# Patient Record
Sex: MALE | Race: WHITE | NOT HISPANIC OR LATINO | URBAN - METROPOLITAN AREA
[De-identification: names, ages, dates, MRNs, and addresses within clinical notes are randomized per-mention and may not be internally consistent; named-entity substitution may affect disease eponyms.]

---

## 2024-08-15 ENCOUNTER — INPATIENT (INPATIENT)
Facility: HOSPITAL | Age: 44
LOS: 3 days | Discharge: HOME | End: 2024-08-19
Attending: STUDENT IN AN ORGANIZED HEALTH CARE EDUCATION/TRAINING PROGRAM | Admitting: INTERNAL MEDICINE
Payer: COMMERCIAL

## 2024-08-15 VITALS
RESPIRATION RATE: 16 BRPM | OXYGEN SATURATION: 98 % | TEMPERATURE: 98 F | SYSTOLIC BLOOD PRESSURE: 133 MMHG | HEART RATE: 102 BPM | DIASTOLIC BLOOD PRESSURE: 73 MMHG

## 2024-08-15 LAB
ALBUMIN SERPL ELPH-MCNC: 3.7 G/DL — SIGNIFICANT CHANGE UP (ref 3.4–5)
ALBUMIN SERPL ELPH-MCNC: 4.4 G/DL — SIGNIFICANT CHANGE UP (ref 3.3–5)
ALP SERPL-CCNC: 57 U/L — SIGNIFICANT CHANGE UP (ref 40–120)
ALP SERPL-CCNC: 63 U/L — SIGNIFICANT CHANGE UP (ref 40–120)
ALT FLD-CCNC: 35 U/L — SIGNIFICANT CHANGE UP (ref 10–45)
ALT FLD-CCNC: 46 U/L — HIGH (ref 12–42)
AMPHET UR-MCNC: NEGATIVE — SIGNIFICANT CHANGE UP
ANION GAP SERPL CALC-SCNC: 11 MMOL/L — SIGNIFICANT CHANGE UP (ref 5–17)
ANION GAP SERPL CALC-SCNC: 14 MMOL/L — SIGNIFICANT CHANGE UP (ref 5–17)
ANION GAP SERPL CALC-SCNC: 16 MMOL/L — SIGNIFICANT CHANGE UP (ref 9–16)
APPEARANCE UR: CLEAR — SIGNIFICANT CHANGE UP
APTT BLD: 28.1 SEC — SIGNIFICANT CHANGE UP (ref 24.5–35.6)
AST SERPL-CCNC: 48 U/L — HIGH (ref 15–37)
AST SERPL-CCNC: 88 U/L — HIGH (ref 10–40)
BACTERIA # UR AUTO: NEGATIVE /HPF — SIGNIFICANT CHANGE UP
BARBITURATES UR SCN-MCNC: NEGATIVE — SIGNIFICANT CHANGE UP
BASOPHILS # BLD AUTO: 0.02 K/UL — SIGNIFICANT CHANGE UP (ref 0–0.2)
BASOPHILS # BLD AUTO: 0.05 K/UL — SIGNIFICANT CHANGE UP (ref 0–0.2)
BASOPHILS NFR BLD AUTO: 0.1 % — SIGNIFICANT CHANGE UP (ref 0–2)
BASOPHILS NFR BLD AUTO: 0.3 % — SIGNIFICANT CHANGE UP (ref 0–2)
BENZODIAZ UR-MCNC: NEGATIVE — SIGNIFICANT CHANGE UP
BILIRUB SERPL-MCNC: 1.8 MG/DL — HIGH (ref 0.2–1.2)
BILIRUB SERPL-MCNC: 1.9 MG/DL — HIGH (ref 0.2–1.2)
BILIRUB UR-MCNC: NEGATIVE — SIGNIFICANT CHANGE UP
BLD GP AB SCN SERPL QL: NEGATIVE — SIGNIFICANT CHANGE UP
BUN SERPL-MCNC: 11 MG/DL — SIGNIFICANT CHANGE UP (ref 7–23)
BUN SERPL-MCNC: 6 MG/DL — LOW (ref 7–23)
BUN SERPL-MCNC: 7 MG/DL — SIGNIFICANT CHANGE UP (ref 7–23)
CALCIUM SERPL-MCNC: 7.6 MG/DL — LOW (ref 8.5–10.5)
CALCIUM SERPL-MCNC: 7.8 MG/DL — LOW (ref 8.4–10.5)
CALCIUM SERPL-MCNC: 7.9 MG/DL — LOW (ref 8.4–10.5)
CHLORIDE SERPL-SCNC: 83 MMOL/L — LOW (ref 96–108)
CHLORIDE SERPL-SCNC: 89 MMOL/L — LOW (ref 96–108)
CHLORIDE SERPL-SCNC: 90 MMOL/L — LOW (ref 96–108)
CO2 SERPL-SCNC: 17 MMOL/L — LOW (ref 22–31)
CO2 SERPL-SCNC: 20 MMOL/L — LOW (ref 22–31)
CO2 SERPL-SCNC: 23 MMOL/L — SIGNIFICANT CHANGE UP (ref 22–31)
COCAINE METAB.OTHER UR-MCNC: NEGATIVE — SIGNIFICANT CHANGE UP
COLOR SPEC: YELLOW — SIGNIFICANT CHANGE UP
CREAT ?TM UR-MCNC: 5 MG/DL — SIGNIFICANT CHANGE UP
CREAT SERPL-MCNC: 0.67 MG/DL — SIGNIFICANT CHANGE UP (ref 0.5–1.3)
CREAT SERPL-MCNC: 0.75 MG/DL — SIGNIFICANT CHANGE UP (ref 0.5–1.3)
CREAT SERPL-MCNC: 1.06 MG/DL — SIGNIFICANT CHANGE UP (ref 0.5–1.3)
DIFF PNL FLD: ABNORMAL
EGFR: 113 ML/MIN/1.73M2 — SIGNIFICANT CHANGE UP
EGFR: 117 ML/MIN/1.73M2 — SIGNIFICANT CHANGE UP
EGFR: 88 ML/MIN/1.73M2 — SIGNIFICANT CHANGE UP
EOSINOPHIL # BLD AUTO: 0.01 K/UL — SIGNIFICANT CHANGE UP (ref 0–0.5)
EOSINOPHIL # BLD AUTO: 0.01 K/UL — SIGNIFICANT CHANGE UP (ref 0–0.5)
EOSINOPHIL NFR BLD AUTO: 0.1 % — SIGNIFICANT CHANGE UP (ref 0–6)
EOSINOPHIL NFR BLD AUTO: 0.1 % — SIGNIFICANT CHANGE UP (ref 0–6)
EPI CELLS # UR: PRESENT
ETHANOL SERPL-MCNC: <3 MG/DL — SIGNIFICANT CHANGE UP
FENTANYL UR QL SCN: NEGATIVE — SIGNIFICANT CHANGE UP
GLUCOSE SERPL-MCNC: 131 MG/DL — HIGH (ref 70–99)
GLUCOSE SERPL-MCNC: 141 MG/DL — HIGH (ref 70–99)
GLUCOSE SERPL-MCNC: 168 MG/DL — HIGH (ref 70–99)
GLUCOSE UR QL: NEGATIVE MG/DL — SIGNIFICANT CHANGE UP
HCT VFR BLD CALC: 32.6 % — LOW (ref 39–50)
HCT VFR BLD CALC: 36.2 % — LOW (ref 39–50)
HCT VFR BLD CALC: SIGNIFICANT CHANGE UP (ref 39–50)
HGB BLD-MCNC: 12.1 G/DL — LOW (ref 13–17)
HGB BLD-MCNC: 13.1 G/DL — SIGNIFICANT CHANGE UP (ref 13–17)
HGB BLD-MCNC: 13.8 G/DL — SIGNIFICANT CHANGE UP (ref 13–17)
IMM GRANULOCYTES NFR BLD AUTO: 0.4 % — SIGNIFICANT CHANGE UP (ref 0–0.9)
IMM GRANULOCYTES NFR BLD AUTO: 1.4 % — HIGH (ref 0–0.9)
INR BLD: 0.98 — SIGNIFICANT CHANGE UP (ref 0.85–1.18)
KETONES UR-MCNC: ABNORMAL MG/DL
LACTATE BLDV-MCNC: 10 MMOL/L — CRITICAL HIGH (ref 0.5–2)
LACTATE BLDV-MCNC: 5.1 MMOL/L — CRITICAL HIGH (ref 0.5–2)
LACTATE SERPL-SCNC: 2.9 MMOL/L — HIGH (ref 0.5–2)
LACTATE SERPL-SCNC: 3.6 MMOL/L — HIGH (ref 0.5–2)
LEUKOCYTE ESTERASE UR-ACNC: NEGATIVE — SIGNIFICANT CHANGE UP
LYMPHOCYTES # BLD AUTO: 0.9 K/UL — LOW (ref 1–3.3)
LYMPHOCYTES # BLD AUTO: 1.03 K/UL — SIGNIFICANT CHANGE UP (ref 1–3.3)
LYMPHOCYTES # BLD AUTO: 6.2 % — LOW (ref 13–44)
LYMPHOCYTES # BLD AUTO: 6.6 % — LOW (ref 13–44)
LYMPHOCYTES # BLD AUTO: SIGNIFICANT CHANGE UP (ref 13–44)
MAGNESIUM SERPL-MCNC: 1.1 MG/DL — LOW (ref 1.6–2.6)
MAGNESIUM SERPL-MCNC: 1.8 MG/DL — SIGNIFICANT CHANGE UP (ref 1.6–2.6)
MAGNESIUM SERPL-MCNC: 2 MG/DL — SIGNIFICANT CHANGE UP (ref 1.6–2.6)
MCHC RBC-ENTMCNC: 32.8 PG — SIGNIFICANT CHANGE UP (ref 27–34)
MCHC RBC-ENTMCNC: 33.9 PG — SIGNIFICANT CHANGE UP (ref 27–34)
MCHC RBC-ENTMCNC: 36.2 GM/DL — HIGH (ref 32–36)
MCHC RBC-ENTMCNC: 37.1 GM/DL — HIGH (ref 32–36)
MCHC RBC-ENTMCNC: SIGNIFICANT CHANGE UP (ref 27–34)
MCHC RBC-ENTMCNC: SIGNIFICANT CHANGE UP (ref 32–36)
MCV RBC AUTO: 88.3 FL — SIGNIFICANT CHANGE UP (ref 80–100)
MCV RBC AUTO: 93.5 FL — SIGNIFICANT CHANGE UP (ref 80–100)
MCV RBC AUTO: SIGNIFICANT CHANGE UP (ref 80–100)
METHADONE UR-MCNC: NEGATIVE — SIGNIFICANT CHANGE UP
MONOCYTES # BLD AUTO: 0.66 K/UL — SIGNIFICANT CHANGE UP (ref 0–0.9)
MONOCYTES # BLD AUTO: 1.27 K/UL — HIGH (ref 0–0.9)
MONOCYTES NFR BLD AUTO: 4 % — SIGNIFICANT CHANGE UP (ref 2–14)
MONOCYTES NFR BLD AUTO: 9.3 % — SIGNIFICANT CHANGE UP (ref 2–14)
MONOCYTES NFR BLD AUTO: SIGNIFICANT CHANGE UP (ref 2–14)
NEUTROPHILS # BLD AUTO: 11.38 K/UL — HIGH (ref 1.8–7.4)
NEUTROPHILS # BLD AUTO: 14.71 K/UL — HIGH (ref 1.8–7.4)
NEUTROPHILS NFR BLD AUTO: 83.5 % — HIGH (ref 43–77)
NEUTROPHILS NFR BLD AUTO: 88 % — HIGH (ref 43–77)
NEUTROPHILS NFR BLD AUTO: SIGNIFICANT CHANGE UP (ref 43–77)
NITRITE UR-MCNC: NEGATIVE — SIGNIFICANT CHANGE UP
NRBC # BLD: 0 /100 WBCS — SIGNIFICANT CHANGE UP (ref 0–0)
NRBC # BLD: 0 /100 WBCS — SIGNIFICANT CHANGE UP (ref 0–0)
OPIATES UR-MCNC: NEGATIVE — SIGNIFICANT CHANGE UP
OSMOLALITY SERPL: 261 MOSM/KG — LOW (ref 275–300)
OSMOLALITY UR: 60 MOSM/KG — LOW (ref 300–900)
PCP SPEC-MCNC: SIGNIFICANT CHANGE UP
PCP UR-MCNC: NEGATIVE — SIGNIFICANT CHANGE UP
PH UR: 5.5 — SIGNIFICANT CHANGE UP (ref 5–8)
PHOSPHATE SERPL-MCNC: 1.5 MG/DL — LOW (ref 2.5–4.5)
PHOSPHATE SERPL-MCNC: 2.1 MG/DL — LOW (ref 2.5–4.5)
PLATELET # BLD AUTO: 223 K/UL — SIGNIFICANT CHANGE UP (ref 150–400)
PLATELET # BLD AUTO: 241 K/UL — SIGNIFICANT CHANGE UP (ref 150–400)
PLATELET # BLD AUTO: 296 K/UL — SIGNIFICANT CHANGE UP (ref 150–400)
POTASSIUM SERPL-MCNC: 4 MMOL/L — SIGNIFICANT CHANGE UP (ref 3.5–5.3)
POTASSIUM SERPL-MCNC: 4.2 MMOL/L — SIGNIFICANT CHANGE UP (ref 3.5–5.3)
POTASSIUM SERPL-MCNC: 5.2 MMOL/L — SIGNIFICANT CHANGE UP (ref 3.5–5.3)
POTASSIUM SERPL-SCNC: 4 MMOL/L — SIGNIFICANT CHANGE UP (ref 3.5–5.3)
POTASSIUM SERPL-SCNC: 4.2 MMOL/L — SIGNIFICANT CHANGE UP (ref 3.5–5.3)
POTASSIUM SERPL-SCNC: 5.2 MMOL/L — SIGNIFICANT CHANGE UP (ref 3.5–5.3)
POTASSIUM UR-SCNC: 4 MMOL/L — SIGNIFICANT CHANGE UP
PROLACTIN SERPL-MCNC: 5.05 NG/ML — SIGNIFICANT CHANGE UP (ref 3.4–24.1)
PROT ?TM UR-MCNC: <4 MG/DL — SIGNIFICANT CHANGE UP (ref 0–12)
PROT SERPL-MCNC: 6.8 G/DL — SIGNIFICANT CHANGE UP (ref 6–8.3)
PROT SERPL-MCNC: 7 G/DL — SIGNIFICANT CHANGE UP (ref 6.4–8.2)
PROT UR-MCNC: ABNORMAL MG/DL
PROT/CREAT UR-RTO: <0.8 RATIO — HIGH (ref 0–0.2)
PROTHROM AB SERPL-ACNC: 11.2 SEC — SIGNIFICANT CHANGE UP (ref 9.5–13)
RBC # BLD: 3.69 M/UL — LOW (ref 4.2–5.8)
RBC # BLD: 3.87 M/UL — LOW (ref 4.2–5.8)
RBC # BLD: SIGNIFICANT CHANGE UP (ref 4.2–5.8)
RBC # FLD: 12.7 % — SIGNIFICANT CHANGE UP (ref 10.3–14.5)
RBC # FLD: 12.7 % — SIGNIFICANT CHANGE UP (ref 10.3–14.5)
RBC CASTS # UR COMP ASSIST: 2 /HPF — SIGNIFICANT CHANGE UP (ref 0–4)
RH IG SCN BLD-IMP: POSITIVE — SIGNIFICANT CHANGE UP
SODIUM SERPL-SCNC: 116 MMOL/L — CRITICAL LOW (ref 132–145)
SODIUM SERPL-SCNC: 120 MMOL/L — CRITICAL LOW (ref 132–145)
SODIUM SERPL-SCNC: 123 MMOL/L — LOW (ref 135–145)
SODIUM SERPL-SCNC: 124 MMOL/L — LOW (ref 135–145)
SODIUM UR-SCNC: <20 MMOL/L — SIGNIFICANT CHANGE UP
SP GR SPEC: 1.01 — SIGNIFICANT CHANGE UP (ref 1–1.03)
THC UR QL: POSITIVE
TROPONIN I, HIGH SENSITIVITY RESULT: 18.4 NG/L — SIGNIFICANT CHANGE UP
TSH SERPL-MCNC: 0.55 UIU/ML — SIGNIFICANT CHANGE UP (ref 0.27–4.2)
UROBILINOGEN FLD QL: 0.2 MG/DL — SIGNIFICANT CHANGE UP (ref 0.2–1)
UUN UR-MCNC: 41 MG/DL — SIGNIFICANT CHANGE UP
WBC # BLD: 13.64 K/UL — HIGH (ref 3.8–10.5)
WBC # BLD: 15.56 K/UL — HIGH (ref 3.8–10.5)
WBC # BLD: 16.69 K/UL — HIGH (ref 3.8–10.5)
WBC # FLD AUTO: 13.64 K/UL — HIGH (ref 3.8–10.5)
WBC # FLD AUTO: 15.56 K/UL — HIGH (ref 3.8–10.5)
WBC # FLD AUTO: 16.69 K/UL — HIGH (ref 3.8–10.5)
WBC UR QL: 2 /HPF — SIGNIFICANT CHANGE UP (ref 0–5)

## 2024-08-15 PROCEDURE — 99291 CRITICAL CARE FIRST HOUR: CPT

## 2024-08-15 PROCEDURE — 71045 X-RAY EXAM CHEST 1 VIEW: CPT | Mod: 26

## 2024-08-15 PROCEDURE — 70450 CT HEAD/BRAIN W/O DYE: CPT | Mod: 26,MC

## 2024-08-15 PROCEDURE — 99285 EMERGENCY DEPT VISIT HI MDM: CPT

## 2024-08-15 PROCEDURE — 74177 CT ABD & PELVIS W/CONTRAST: CPT | Mod: 26,MC

## 2024-08-15 RX ORDER — LORAZEPAM 4 MG/ML
2 INJECTION INTRAMUSCULAR; INTRAVENOUS ONCE
Refills: 0 | Status: DISCONTINUED | OUTPATIENT
Start: 2024-08-15 | End: 2024-08-15

## 2024-08-15 RX ORDER — SODIUM CHLORIDE 3 G/100ML
100 INJECTION, SOLUTION INTRAVENOUS ONCE
Refills: 0 | Status: DISCONTINUED | OUTPATIENT
Start: 2024-08-15 | End: 2024-08-15

## 2024-08-15 RX ORDER — ENOXAPARIN SODIUM 100 MG/ML
40 INJECTION SUBCUTANEOUS EVERY 24 HOURS
Refills: 0 | Status: DISCONTINUED | OUTPATIENT
Start: 2024-08-15 | End: 2024-08-19

## 2024-08-15 RX ORDER — VANCOMYCIN/0.9 % SOD CHLORIDE 1.75G/25
1500 PLASTIC BAG, INJECTION (ML) INTRAVENOUS ONCE
Refills: 0 | Status: DISCONTINUED | OUTPATIENT
Start: 2024-08-15 | End: 2024-08-15

## 2024-08-15 RX ORDER — VANCOMYCIN/0.9 % SOD CHLORIDE 1.75G/25
1000 PLASTIC BAG, INJECTION (ML) INTRAVENOUS ONCE
Refills: 0 | Status: COMPLETED | OUTPATIENT
Start: 2024-08-15 | End: 2024-08-15

## 2024-08-15 RX ORDER — SODIUM CHLORIDE 3 G/100ML
100 INJECTION, SOLUTION INTRAVENOUS ONCE
Refills: 0 | Status: COMPLETED | OUTPATIENT
Start: 2024-08-15 | End: 2024-08-15

## 2024-08-15 RX ORDER — DESMOPRESSIN ACETATE 4 UG/ML
2 INJECTION, SOLUTION INTRAVENOUS; SUBCUTANEOUS ONCE
Refills: 0 | Status: COMPLETED | OUTPATIENT
Start: 2024-08-15 | End: 2024-08-15

## 2024-08-15 RX ORDER — SODIUM CHLORIDE 9 MG/ML
1000 INJECTION INTRAMUSCULAR; INTRAVENOUS; SUBCUTANEOUS ONCE
Refills: 0 | Status: COMPLETED | OUTPATIENT
Start: 2024-08-15 | End: 2024-08-15

## 2024-08-15 RX ORDER — CHLORHEXIDINE GLUCONATE 40 MG/ML
1 SOLUTION TOPICAL
Refills: 0 | Status: DISCONTINUED | OUTPATIENT
Start: 2024-08-15 | End: 2024-08-19

## 2024-08-15 RX ORDER — DESMOPRESSIN ACETATE 4 UG/ML
2 INJECTION, SOLUTION INTRAVENOUS; SUBCUTANEOUS ONCE
Refills: 0 | Status: DISCONTINUED | OUTPATIENT
Start: 2024-08-15 | End: 2024-08-15

## 2024-08-15 RX ORDER — DEXMEDETOMIDINE HYDROCHLORIDE IN 0.9% SODIUM CHLORIDE 4 UG/ML
0.2 INJECTION INTRAVENOUS
Qty: 400 | Refills: 0 | Status: DISCONTINUED | OUTPATIENT
Start: 2024-08-15 | End: 2024-08-17

## 2024-08-15 RX ORDER — DESMOPRESSIN ACETATE 4 UG/ML
2 INJECTION, SOLUTION INTRAVENOUS; SUBCUTANEOUS
Refills: 0 | Status: DISCONTINUED | OUTPATIENT
Start: 2024-08-15 | End: 2024-08-16

## 2024-08-15 RX ADMIN — Medication 100 MILLIGRAM(S): at 13:19

## 2024-08-15 RX ADMIN — DEXMEDETOMIDINE HYDROCHLORIDE IN 0.9% SODIUM CHLORIDE 3.4 MICROGRAM(S)/KG/HR: 4 INJECTION INTRAVENOUS at 17:49

## 2024-08-15 RX ADMIN — Medication 25 GRAM(S): at 12:34

## 2024-08-15 RX ADMIN — DESMOPRESSIN ACETATE 2 MICROGRAM(S): 4 INJECTION, SOLUTION INTRAVENOUS; SUBCUTANEOUS at 16:29

## 2024-08-15 RX ADMIN — LORAZEPAM 2 MILLIGRAM(S): 4 INJECTION INTRAMUSCULAR; INTRAVENOUS at 16:29

## 2024-08-15 RX ADMIN — Medication 250 MILLILITER(S): at 16:54

## 2024-08-15 RX ADMIN — SODIUM CHLORIDE 600 MILLILITER(S): 3 INJECTION, SOLUTION INTRAVENOUS at 12:35

## 2024-08-15 RX ADMIN — LORAZEPAM 2 MILLIGRAM(S): 4 INJECTION INTRAMUSCULAR; INTRAVENOUS at 17:49

## 2024-08-15 RX ADMIN — Medication 250 MILLIGRAM(S): at 14:08

## 2024-08-15 RX ADMIN — LORAZEPAM 2 MILLIGRAM(S): 4 INJECTION INTRAMUSCULAR; INTRAVENOUS at 14:06

## 2024-08-15 RX ADMIN — LORAZEPAM 2 MILLIGRAM(S): 4 INJECTION INTRAMUSCULAR; INTRAVENOUS at 11:46

## 2024-08-15 RX ADMIN — SODIUM CHLORIDE 100 MILLILITER(S): 9 INJECTION INTRAMUSCULAR; INTRAVENOUS; SUBCUTANEOUS at 14:07

## 2024-08-15 RX ADMIN — SODIUM CHLORIDE 1000 MILLILITER(S): 9 INJECTION INTRAMUSCULAR; INTRAVENOUS; SUBCUTANEOUS at 11:46

## 2024-08-15 NOTE — ED ADULT NURSE NOTE - CAS EDN DISCHARGE INTERVENTIONS
English
IV intact
Mercedes Flap Text: The defect edges were debeveled with a #15 scalpel blade.  Given the location of the defect, shape of the defect and the proximity to free margins a Mercedes flap was deemed most appropriate.  Using a sterile surgical marker, an appropriate advancement flap was drawn incorporating the defect and placing the expected incisions within the relaxed skin tension lines where possible. The area thus outlined was incised deep to adipose tissue with a #15 scalpel blade.  The skin margins were undermined to an appropriate distance in all directions utilizing iris scissors.

## 2024-08-15 NOTE — ED ADULT NURSE REASSESSMENT NOTE - NS ED NURSE REASSESS COMMENT FT1
EICU consultation completed with EMS at bedside. As per Dr. Oliva, pt is okay for transport to [facility] ICU. All medications and dosages confirmed with EMS. VSS

## 2024-08-15 NOTE — H&P ADULT - ASSESSMENT
Patient is 44yMale with no significant PMH who presents from [facility] w seizures likely 2/2 symptomatic hyponatremia.     NEURO  #Encephalopathic      CV  #      PULM  #      RENAL  #Hyponatremia  #I&O      GI  #      ENDO  #      ID  #      HEME/ONC  #      ID:  #      SKIN:  #Lines:      PREVENTIVE   F:  E:  N:  GI:  DVT:  DISPO:       Patient is 44 YOM with no significant PMH who presents from [facility] w seizures likely 2/2 symptomatic hyponatremia. Patient remains altered, monitoring closely.    NEURO  #Encephalopathic      CV  ERICK      PULM  ERICK      RENAL  #Hyponatremia  Patient     #I&O      GI  #      ENDO  #      ID:  #Leukocytosis  Noted to have leukocytosis in ED, no fevers  Likely reactive iso seizures    - monitor fever curve and WBC      HEME/ONC  ERICK        SKIN:  #Lines: PIV      PREVENTIVE   F: D5W  E:   N: NPO  GI:  DVT:  DISPO: MICU       Patient is 44 YOM with no significant PMH who presents from [facility] w seizures likely 2/2 symptomatic hyponatremia. Patient remains altered, monitoring closely with regular CMP given potential rapid overcorrection.    NEURO  #Encephalopathic  AMS 2/2 symptomatic hyponatremia  Now s/p 8mg Ativan given agitation    - c/w Precedex drip for now given agitation  - correcting Na as below    CV  ERICK      PULM  ERICK      RENAL  #Hyponatremia  Patient with severe hypoNa to 116 after drinking a large amount of tap water d/t marijuana intoxication  Serum osmolarity and urine studies indicated of primary polydipsia  Patients can rapidly overcorrect, monitor closely  s/p 100cc hypertonic saline in [facility] ED    - Q2H CMP  - started 2mcg DDAVP Q6H  - goal is 124 by 11AM tomorrow morning  - D5W boluses as needed to maintain goal    #I&O  Castillo in place  - monitor closely     GI  ERICK      ENDO  ERICK      ID:  #Leukocytosis  Noted to have leukocytosis in ED, no fevers  Likely reactive iso seizures    - monitor fever curve and WBC      HEME/ONC  ERICK        SKIN:  #Lines: PIV      PREVENTIVE   F: D5W as needed  E: K>4, Mg>2  N: NPO  GI: none  DVT: lovenox 40mg  DISPO: MICU

## 2024-08-15 NOTE — H&P ADULT - CRITICAL CARE ATTENDING COMMENT
symptomatic hyponatremia, s/p hypertonic bolus and ddavp  de la vega in place, q4h BMP and urine osms and Na  renal following

## 2024-08-15 NOTE — PROVIDER CONTACT NOTE (EICU) - BACKGROUND
43 yo M with no PMH a/w AMS and s/p seizure like activity.  Patient noted to have taken 40 mg of THC and 1000 mg of CBD in an attempt to sleep last night.  Noted to have associated nausea and vomiting.      Patient given 1L bolus of NS in ED, and 100cc NaCl 3% with improvement of Na from 116 to 120.  Pt also received Ativan 2mg x 1 and Magnesium Sulfate 2 grams x 1.  Lactate improved from 10 to 5.1 with IVF.    CTH with no acute abnormalities.  CT A/P notable for " No bowel obstruction. Mild periportal edema, which can be seen in setting of aggressive IV hydration. Other causes of hepatic edema include acute hepatitis or hypoalbuminemia may also be considered."

## 2024-08-15 NOTE — H&P ADULT - NSHPPHYSICALEXAM_GEN_ALL_CORE
VITAL SIGNS:  Vital Signs Last 24 Hrs  T(C): 37.4 (15 Aug 2024 16:30), Max: 37.4 (15 Aug 2024 16:30)  T(F): 99.3 (15 Aug 2024 16:30), Max: 99.3 (15 Aug 2024 16:30)  HR: 98 (15 Aug 2024 16:30) (75 - 102)  BP: 164/80 (15 Aug 2024 16:30) (121/65 - 169/73)  BP(mean): 114 (15 Aug 2024 16:30) (87 - 114)  RR: 22 (15 Aug 2024 16:30) (16 - 22)  SpO2: 88% (15 Aug 2024 16:30) (88% - 99%)    Parameters below as of 15 Aug 2024 16:30  Patient On (Oxygen Delivery Method): room air        GEN: Resting comfortably in NAD  ENMT: Moist oral mucosa; no conjunctival injection  RESP: No respiratory distress, no use of accessory muscles; CTA b/l, no WRR  CV: RRR, +S1S2, no MRG; no JVD; no peripheral edema  2+ radial, DP, PT   GI: Soft, NT, ND, no rebound, no guarding  : Castillo in place draining entirely clear urine  MSK: No digital clubbing or cyanosis  SKIN: No rashes or ulcers noted  NEURO: Answers questions appropriately, moving all extremities spontaneously VITAL SIGNS:  Vital Signs Last 24 Hrs  T(C): 37.4 (15 Aug 2024 16:30), Max: 37.4 (15 Aug 2024 16:30)  T(F): 99.3 (15 Aug 2024 16:30), Max: 99.3 (15 Aug 2024 16:30)  HR: 98 (15 Aug 2024 16:30) (75 - 102)  BP: 164/80 (15 Aug 2024 16:30) (121/65 - 169/73)  BP(mean): 114 (15 Aug 2024 16:30) (87 - 114)  RR: 22 (15 Aug 2024 16:30) (16 - 22)  SpO2: 88% (15 Aug 2024 16:30) (88% - 99%)    Parameters below as of 15 Aug 2024 16:30  Patient On (Oxygen Delivery Method): room air      GEN: Resting comfortably in NAD  ENMT: Moist oral mucosa; no conjunctival injection  RESP: No respiratory distress, no use of accessory muscles; CTA b/l, no WRR  CV: RRR, +S1S2, no MRG; no JVD; no peripheral edema  2+ radial, DP, PT   GI: Soft, NT, ND, no rebound, no guarding  : Castillo in place draining entirely clear urine  MSK: No digital clubbing or cyanosis  SKIN: No rashes or ulcers noted  NEURO: Answers questions appropriately, moving all extremities spontaneously VITAL SIGNS:  Vital Signs Last 24 Hrs  T(C): 37.4 (15 Aug 2024 16:30), Max: 37.4 (15 Aug 2024 16:30)  T(F): 99.3 (15 Aug 2024 16:30), Max: 99.3 (15 Aug 2024 16:30)  HR: 98 (15 Aug 2024 16:30) (75 - 102)  BP: 164/80 (15 Aug 2024 16:30) (121/65 - 169/73)  BP(mean): 114 (15 Aug 2024 16:30) (87 - 114)  RR: 22 (15 Aug 2024 16:30) (16 - 22)  SpO2: 88% (15 Aug 2024 16:30) (88% - 99%)    Parameters below as of 15 Aug 2024 16:30  Patient On (Oxygen Delivery Method): room air      GEN: Obtunded  ENMT: Moist oral mucosa; no conjunctival injection, pupils large b/l but reactive  RESP: No respiratory distress, no use of accessory muscles; CTA b/l, no WRR  CV: RRR, +S1S2, no MRG; no JVD; no peripheral edema  2+ radial, DP, PT   GI: Soft, NT, ND, no rebound, no guarding  : Castillo in place draining entirely clear urine  MSK: No digital clubbing or cyanosis  SKIN: No rashes or ulcers noted  NEURO: Altered, currently non-responsive to questions, moving all extremities spontaneously

## 2024-08-15 NOTE — CHART NOTE - NSCHARTNOTEFT_GEN_A_CORE
Chart reviewed, f/u corrected Na ~124meq @ 9:23pm, stable, with UOP decreasing to ~33ml/hr during last 3hr, indicative of DDAVP action.   Continue current management with standing DDAVP, will f/u next Na level in am labs and if Na>124meq admin 500ml bolus of D5W and recheck bmp 1h after DW5 completed.

## 2024-08-15 NOTE — PROVIDER CONTACT NOTE (EICU) - ASSESSMENT
Case discussed with bedside nurse and EMS at bedside.  Case reviewed with EMS.  - Continue NS at 75cc/hr for transport  - Vancomycin 1g IVPB to be given.  - EMS concern for mental status - Noted to have recent seizure and hyponatremia as likely causes.  CTH with no ICH.  Would not correct Na faster given unknown chronicity.  Will aim for 6-8mEq in 24 hours, and Na noted to improvement to 120 (delta of +4) at this time.

## 2024-08-15 NOTE — ED PROVIDER NOTE - PROGRESS NOTE DETAILS
Patient is sitting comfortably room, no acute distress  Labs and imaging reviewed  Labs are significant for a lactate of 10, sodium of 116, a white count of 16,000, and a magnesium of 1.1.  Patient also has positive for THC as expected  Will give patient a dose of hypertonic saline.  Patient has received ceftriaxone and vancomycin as he met sepsis criteria.  CT head is unremarkable  Spoke with Dr. Gant, intensivist, who accepts patient to his service.  Is requesting to get an abdominal CT and repeat sodium after dose of hypertonic saline.  Patient accepted to medical ICU.

## 2024-08-15 NOTE — ED PROVIDER NOTE - PHYSICAL EXAMINATION
General: well developed, well nourished, no distress  Eyes: no scleral injection, pupils = PERRLA  Neck: non-tender, full range of motion, supple.   Respiratory: unlabored breathing, CTAB  Cardiovascular: no central cyanosis, RRR  Neurologic: responsive to verbal stimuli  Skin: normal color.  Negative For: rash

## 2024-08-15 NOTE — CONSULT NOTE ADULT - SUBJECTIVE AND OBJECTIVE BOX
NEPHROLOGY CONSULTATION NOTE    43 y/o M w no PMH transferred from [facility] with AMS and seizure like activity. Took 40mg of THC and 1000mg CBD last night plus a glass of wine and wake up this morning feeling dizzy, then started drinking water, as per pt's wife: "he drank water for 2 and a half hours straight and started vomiting and having abnormal behaviour, he was slightly out of it, and unsteady on his feet". Wife states that he does not have a seizure hx, and this has never happened before. When EMS got to the scene pt was combative. Corrected Na upon arrival this am was 117meq, now pte in ICU with Na 124, corrected 7 meq in 5h period, Nephrology consulted.     PAST MEDICAL & SURGICAL HISTORY:  No pertinent past medical history    Allergies:  No Known Allergies    Home Medications Reviewed  Hospital Medications:   MEDICATIONS  (STANDING):  chlorhexidine 2% Cloths 1 Application(s) Topical <User Schedule>  desmopressin Injectable 2 MICROGram(s) IV Push <User Schedule>  dexMEDEtomidine Infusion 0.2 MICROgram(s)/kG/Hr (3.4 mL/Hr) IV Continuous <Continuous>  dextrose 5%. 500 milliLiter(s) (250 mL/Hr) IV Continuous <Continuous>  dextrose 5%. 1000 milliLiter(s) (250 mL/Hr) IV Continuous <Continuous>  dextrose 5%. 500 milliLiter(s) (250 mL/Hr) IV Continuous <Continuous>  enoxaparin Injectable 40 milliGRAM(s) SubCutaneous every 24 hours    SOCIAL HISTORY:  Denies ETOH,Smoking,   FAMILY HISTORY:    REVIEW OF SYSTEMS:  All other review of systems is negative unless indicated above.    VITALS:  Vital Signs Last 24 Hrs  T(C): 37.4 (15 Aug 2024 16:30), Max: 37.4 (15 Aug 2024 16:30)  T(F): 99.3 (15 Aug 2024 16:30), Max: 99.3 (15 Aug 2024 16:30)  HR: 98 (15 Aug 2024 16:30) (75 - 102)  BP: 164/80 (15 Aug 2024 16:30) (121/65 - 169/73)  BP(mean): 114 (15 Aug 2024 16:30) (87 - 114)  RR: 22 (15 Aug 2024 16:30) (16 - 22)  SpO2: 88% (15 Aug 2024 16:30) (88% - 99%)    Parameters below as of 15 Aug 2024 16:30  Patient On (Oxygen Delivery Method): room air        08-15 @ 07:01  -  08-15 @ 18:57  --------------------------------------------------------  IN: 0 mL / OUT: 2800 mL / NET: -2800 mL        Weight (kg): 68 (08-15 @ 12:52)    PHYSICAL EXAM:  Constitutional: NAD  Neck: No JVD  Respiratory: CTAB, no wheezes, rales or rhonchi  Cardiovascular: S1, S2, RRR  Gastrointestinal: ND/NT, +BS  Extremities:  No peripheral edema in LEs.   Neurological: Alert, following commands intermittently, no focal motor deficits.   : + de la vega.         LABS:  08-15    124<L>  |  90<L>  |  7   ----------------------------<  131<H>  4.2   |  20<L>  |  0.67    Ca    7.9<L>      15 Aug 2024 16:28  Phos  2.1     08-15  Mg     2.0     08-15    TPro  6.8  /  Alb  4.4  /  TBili  1.9<H>  /  DBili      /  AST  88<H>  /  ALT  35  /  AlkPhos  57  08-15    Creatinine Trend: 0.67 <--, 1.06 <--                        13.8   15.56 )-----------( 241      ( 15 Aug 2024 16:28 )             See note    Urine Studies:  Urinalysis Basic - ( 15 Aug 2024 16:28 )    Color:  / Appearance:  / SG:  / pH:   Gluc: 131 mg/dL / Ketone:   / Bili:  / Urobili:    Blood:  / Protein:  / Nitrite:    Leuk Esterase:  / RBC:  / WBC    Sq Epi:  / Non Sq Epi:  / Bacteria:       Sodium, Random Urine: <20 mmol/L (08-15 @ 16:45)  Creatinine, Random Urine: 5 mg/dL (08-15 @ 16:45)  Protein/Creatinine Ratio Calculation: <0.8 Ratio (08-15 @ 16:45)  Osmolality, Random Urine: 60 mosm/kg (08-15 @ 16:45)  Potassium, Random Urine: 4 mmol/L (08-15 @ 16:45)            RADIOLOGY & ADDITIONAL STUDIES: Reviewed.

## 2024-08-15 NOTE — ED PROVIDER NOTE - OBJECTIVE STATEMENT
43 yo M, no pmh, presents to this ED for AMS. Pt presents today with wife and daughter. Pt is altered and responsive to verbal stimuli, is agitated and cannot provide a hx.  EMS states that they responded to the 911 rehana from his wife. Wife states that last night he ate 40 mg of THC and 1000 mg of CBD in an attempt to sleep. They are visiting from [place]. Wife states that this morning he felt dizzy, so she instructed him to drink water. STates that "he drank water for 2 and a half hours straight'. Vomited once, and then had seizure like activity. Wife states that he does not have a seizure hx, and this has never happened before. Also states that he has a couple glasses of wine last night with dinner. When EMS got to the scene pt was combative, however they were able to get him on the stretcher and transport him here.

## 2024-08-15 NOTE — ED PROVIDER NOTE - CLINICAL SUMMARY MEDICAL DECISION MAKING FREE TEXT BOX
43 yo M presents to this ED for AMS, seizure like activity  On arrival VSS  Physical exam is significant for AMS, responsive to verbal stimuli, however is not speaking  Will obtain labs, lactate, serum osm, ua, utox, alcohol  Likely hyponatremia 2/2 drinking too much water

## 2024-08-15 NOTE — H&P ADULT - NSHPLABSRESULTS_GEN_ALL_CORE
CTH w/o acute abnormalities, CT A/P w/o bowel obstruction and noted for mild periportal edema which can be seen iso aggressive IV hydration

## 2024-08-15 NOTE — CONSULT NOTE ADULT - ASSESSMENT
43 y/o M w no PMH transferred from [facility] with AMS and seizure like activity. Took 40mg of THC and 1000mg CBD last night plus a glass of wine and wake up this morning feeling dizzy, then started drinking water, as per pt's wife: "he drank water for 2 and a half hours straight and started vomiting and having abnormal behaviour, he was slightly out of it, and unsteady on his feet". Wife states that he does not have a seizure hx, and this has never happened before. When EMS got to the scene pt was combative. Corrected Na upon arrival this am was 117meq, now pte in ICU with Na 124, corrected 7 meq in 5h period, Nephrology consulted.     Labs/Radiology reviewed.   Na 117 upon arrival @ 11: 30 am, now 124 @ 6:30pm.   Serum Osmo 261  U. Osmo 60  U. Na<20 (explained by the use of IV contrast)  U. spec gravity 1.015 (explained by the use of IV contrast)  UPCR 0.8  UA: protein, small blood and 2RBC.  U. THC +  TSH 0.5  P 2.1  LA 10->5.1  sCr 0.67    SBP stable 120-160 range.   Severely polyuric with 2.8 L uop during last 6h.     Imp: Symptomatic Hyponatremia(severe).   Also noted non oliguric GAB upon arrival that is now resolved.     Pt is polyuric and has a low U. Osmolality, these are reflective of no ADH activity(that could had been elevated previously), most likely a physiologic response after water ingestion and IVF administered.   U. Na<20 can be explained for the administration of IV contrast, same way the U. specific gravity is not reflecting a dilute UA, even though the pte is polyuric and his U. osmolality is<100.     Plan:  Will treat as chronic hyponatremia using a correction rate of 4-6h for a 24h period.   Aiming for serum Na of ~122meq for 8/16 @ 11am, not higher than 124meq(upper limit of correction). Pte is currently at the upper limit of correction.    Started standing 2mcg of DDAVP IV q6h.   Obtain time critical bmp and U. osmolality tonight @ 8pm.   Most likely pte will need multiple bolus of D5W to maintain Na no higher than 124meq until DDAVP start having effect(1st dose admin @ 4pm).   Standing BMP and U. osmolality q4h for the next 24h.   Strict I&O, keep Castillo.   Obtain U. acid levels  Repeat UA and U. Na on 8/16 am.

## 2024-08-15 NOTE — ED PROVIDER NOTE - INTERNATIONAL TRAVEL
No
You can access the FollowMyHealth Patient Portal offered by [facility] by registering at the following website: [email or web address]. By joining [facility]’s FollowMyHealth portal, you will also be able to view your health information using other applications (apps) compatible with our system.

## 2024-08-15 NOTE — H&P ADULT - HISTORY OF PRESENT ILLNESS
44 YOM w no PMH presentd to [facility] with AMS and seizure like activity. Took 40mg THC and 1000mg CBD in attempt to sleep. Patient remains altered. Per ED provider note, EMS states that they responded to the 911 rehana from his wife. Wife states that last night he ate 40 mg of THC and 1000 mg of CBD in an attempt to sleep. They are visiting from [place]. Wife states that this morning he felt dizzy, so she instructed him to drink water. States that "he drank water for 2 and a half hours straight'. Vomited once, and then had seizure like activity. Wife states that he does not have a seizure hx, and this has never happened before. Also states that he has a couple glasses of wine last night with dinner. When EMS got to the scene pt was combative, however they were able to get him on the stretcher and transport him here.    ED course:  1L NS w 100cc 3% NaCl, improved from 116 to 120  Ativan 2mg x2 due to agitation, mag sulfate 2g x1  CTX 1g, vanc 1g    Vitals: HR 98, T 36.7, /73  Labs: Na 116>120,   Lactate 10>5.1 w IVF  Imaging: CTH w/o acute abnormalities, CT A/P w/o bowel obstruction and noted for mild periportal edema which can be seen iso aggressive IV hydration   44 YOM w no PMH presented to [facility] with AMS and seizure like activity. Took 40mg THC and 1000mg CBD in attempt to sleep. Patient remains altered. History provided by wife at bedside and from chart review. Per ED provider note, EMS states that they responded to the 911 call from his wife. Wife states that last night he ate 40 mg of THC and 1000 mg of CBD in an attempt to sleep. They are visiting from [place]. Wife states that this morning he felt dizzy, so she instructed him to drink water. States that "he drank water for 2 and a half hours straight'. Vomited once, and then had seizure like activity, described as "slightly out of it", wife otherwise noted drooling, shaking, and teeth clenched. Wife states that he does not have a seizure hx, and this has never happened before. Also states that he has a couple glasses of wine last night with dinner. When EMS got to the scene pt was combative, however they were able to get him on the stretcher and transport him here. Wife denied that he complained of any other symptoms, merely unsteady on his feet and feeling "intoxicated".     ED course:  1L NS w 100cc 3% NaCl, improved from 116 to 120  Ativan 2mg x2 due to agitation, mag sulfate 2g x1  CTX 1g, vanc 1g    Vitals: HR 98, T 36.7, /73  Labs: Na 116>120, K 5.2, Cl 83, CO2 17, BUN/Cr, 11/1.06, AST/ALT 48/46, lactate 10>5.1  Imaging: CTH w/o acute abnormalities, CT A/P w/o bowel obstruction and noted for mild periportal edema which can be seen iso aggressive IV hydration

## 2024-08-15 NOTE — ED ADULT NURSE NOTE - OBJECTIVE STATEMENT
Pt BIBA for AMS with family at bedside. As per wife, pt ingested THC yesterday after he was unable to fall asleep at night and woke up very dehydrated. Wife states pt consumed excessive amounts of water prior to having one episode of nausea/vomiting and having seizure-like episode prior to EMS arrival. At this time, pt is responsive to verbal and disoriented. Pt is unable to provide full PMH or participate in assessment. Upon inspection, pupils are dilated, equal, and responsive to light. No signs of injury present. Skin is warm, dry, and color WNL. Respirations equal and unlabored; pt able to maintain airway.

## 2024-08-15 NOTE — ED ADULT TRIAGE NOTE - CHIEF COMPLAINT QUOTE
BIBEMS from hotel. Pt took 40mg THC and 1000mg CBD last night. Pt woke up this am, yelled and then started to "foam at the mouth" as per wife for about 2 mins. Pt not answering triage rn on arrival. Pt defecated on himself while "foaming at the mouth". Pt denies hs this am. Combative with EMS. [facility] called

## 2024-08-16 LAB
ALBUMIN SERPL ELPH-MCNC: 4 G/DL — SIGNIFICANT CHANGE UP (ref 3.3–5)
ALP SERPL-CCNC: 51 U/L — SIGNIFICANT CHANGE UP (ref 40–120)
ALT FLD-CCNC: 28 U/L — SIGNIFICANT CHANGE UP (ref 10–45)
ANION GAP SERPL CALC-SCNC: 11 MMOL/L — SIGNIFICANT CHANGE UP (ref 5–17)
ANION GAP SERPL CALC-SCNC: 13 MMOL/L — SIGNIFICANT CHANGE UP (ref 5–17)
ANION GAP SERPL CALC-SCNC: 9 MMOL/L — SIGNIFICANT CHANGE UP (ref 5–17)
AST SERPL-CCNC: 75 U/L — HIGH (ref 10–40)
BASOPHILS # BLD AUTO: 0.01 K/UL — SIGNIFICANT CHANGE UP (ref 0–0.2)
BASOPHILS NFR BLD AUTO: 0.1 % — SIGNIFICANT CHANGE UP (ref 0–2)
BILIRUB SERPL-MCNC: 1.5 MG/DL — HIGH (ref 0.2–1.2)
BUN SERPL-MCNC: 4 MG/DL — LOW (ref 7–23)
BUN SERPL-MCNC: 4 MG/DL — LOW (ref 7–23)
BUN SERPL-MCNC: 5 MG/DL — LOW (ref 7–23)
CALCIUM SERPL-MCNC: 7.6 MG/DL — LOW (ref 8.4–10.5)
CALCIUM SERPL-MCNC: 7.9 MG/DL — LOW (ref 8.4–10.5)
CALCIUM SERPL-MCNC: 8 MG/DL — LOW (ref 8.4–10.5)
CALCIUM SERPL-MCNC: 8.1 MG/DL — LOW (ref 8.4–10.5)
CALCIUM SERPL-MCNC: 8.2 MG/DL — LOW (ref 8.4–10.5)
CHLORIDE SERPL-SCNC: 88 MMOL/L — LOW (ref 96–108)
CHLORIDE SERPL-SCNC: 90 MMOL/L — LOW (ref 96–108)
CHLORIDE SERPL-SCNC: 90 MMOL/L — LOW (ref 96–108)
CHLORIDE SERPL-SCNC: 93 MMOL/L — LOW (ref 96–108)
CHLORIDE SERPL-SCNC: 94 MMOL/L — LOW (ref 96–108)
CO2 SERPL-SCNC: 18 MMOL/L — LOW (ref 22–31)
CO2 SERPL-SCNC: 19 MMOL/L — LOW (ref 22–31)
CO2 SERPL-SCNC: 21 MMOL/L — LOW (ref 22–31)
CO2 SERPL-SCNC: 21 MMOL/L — LOW (ref 22–31)
CO2 SERPL-SCNC: 23 MMOL/L — SIGNIFICANT CHANGE UP (ref 22–31)
CREAT ?TM UR-MCNC: 272 MG/DL — SIGNIFICANT CHANGE UP
CREAT ?TM UR-MCNC: 345 MG/DL — SIGNIFICANT CHANGE UP
CREAT ?TM UR-MCNC: 370 MG/DL — SIGNIFICANT CHANGE UP
CREAT SERPL-MCNC: 0.65 MG/DL — SIGNIFICANT CHANGE UP (ref 0.5–1.3)
CREAT SERPL-MCNC: 0.67 MG/DL — SIGNIFICANT CHANGE UP (ref 0.5–1.3)
CREAT SERPL-MCNC: 0.67 MG/DL — SIGNIFICANT CHANGE UP (ref 0.5–1.3)
CREAT SERPL-MCNC: 0.73 MG/DL — SIGNIFICANT CHANGE UP (ref 0.5–1.3)
CREAT SERPL-MCNC: 0.75 MG/DL — SIGNIFICANT CHANGE UP (ref 0.5–1.3)
EGFR: 113 ML/MIN/1.73M2 — SIGNIFICANT CHANGE UP
EGFR: 114 ML/MIN/1.73M2 — SIGNIFICANT CHANGE UP
EGFR: 117 ML/MIN/1.73M2 — SIGNIFICANT CHANGE UP
EGFR: 117 ML/MIN/1.73M2 — SIGNIFICANT CHANGE UP
EGFR: 118 ML/MIN/1.73M2 — SIGNIFICANT CHANGE UP
EOSINOPHIL # BLD AUTO: 0.01 K/UL — SIGNIFICANT CHANGE UP (ref 0–0.5)
EOSINOPHIL NFR BLD AUTO: 0.1 % — SIGNIFICANT CHANGE UP (ref 0–6)
GLUCOSE SERPL-MCNC: 120 MG/DL — HIGH (ref 70–99)
GLUCOSE SERPL-MCNC: 127 MG/DL — HIGH (ref 70–99)
GLUCOSE SERPL-MCNC: 134 MG/DL — HIGH (ref 70–99)
GLUCOSE SERPL-MCNC: 139 MG/DL — HIGH (ref 70–99)
GLUCOSE SERPL-MCNC: 149 MG/DL — HIGH (ref 70–99)
HCT VFR BLD CALC: 33.1 % — LOW (ref 39–50)
HGB BLD-MCNC: 12.4 G/DL — LOW (ref 13–17)
IMM GRANULOCYTES NFR BLD AUTO: 0.3 % — SIGNIFICANT CHANGE UP (ref 0–0.9)
LYMPHOCYTES # BLD AUTO: 1.1 K/UL — SIGNIFICANT CHANGE UP (ref 1–3.3)
LYMPHOCYTES # BLD AUTO: 12.1 % — LOW (ref 13–44)
MAGNESIUM SERPL-MCNC: 1.6 MG/DL — SIGNIFICANT CHANGE UP (ref 1.6–2.6)
MAGNESIUM SERPL-MCNC: 1.9 MG/DL — SIGNIFICANT CHANGE UP (ref 1.6–2.6)
MCHC RBC-ENTMCNC: 33.4 PG — SIGNIFICANT CHANGE UP (ref 27–34)
MCHC RBC-ENTMCNC: 37.5 GM/DL — HIGH (ref 32–36)
MCV RBC AUTO: 89.2 FL — SIGNIFICANT CHANGE UP (ref 80–100)
MONOCYTES # BLD AUTO: 0.85 K/UL — SIGNIFICANT CHANGE UP (ref 0–0.9)
MONOCYTES NFR BLD AUTO: 9.3 % — SIGNIFICANT CHANGE UP (ref 2–14)
NEUTROPHILS # BLD AUTO: 7.11 K/UL — SIGNIFICANT CHANGE UP (ref 1.8–7.4)
NEUTROPHILS NFR BLD AUTO: 78.1 % — HIGH (ref 43–77)
NRBC # BLD: 0 /100 WBCS — SIGNIFICANT CHANGE UP (ref 0–0)
OSMOLALITY SERPL: 241 MOSMOL/KG — LOW (ref 275–300)
OSMOLALITY UR: 680 MOSM/KG — SIGNIFICANT CHANGE UP (ref 300–900)
OSMOLALITY UR: 759 MOSM/KG — SIGNIFICANT CHANGE UP (ref 300–900)
OSMOLALITY UR: 759 MOSM/KG — SIGNIFICANT CHANGE UP (ref 300–900)
PHOSPHATE SERPL-MCNC: 1.5 MG/DL — LOW (ref 2.5–4.5)
PHOSPHATE SERPL-MCNC: 2 MG/DL — LOW (ref 2.5–4.5)
PLATELET # BLD AUTO: 194 K/UL — SIGNIFICANT CHANGE UP (ref 150–400)
POTASSIUM SERPL-MCNC: 3.6 MMOL/L — SIGNIFICANT CHANGE UP (ref 3.5–5.3)
POTASSIUM SERPL-MCNC: 3.7 MMOL/L — SIGNIFICANT CHANGE UP (ref 3.5–5.3)
POTASSIUM SERPL-MCNC: 3.8 MMOL/L — SIGNIFICANT CHANGE UP (ref 3.5–5.3)
POTASSIUM SERPL-MCNC: 3.9 MMOL/L — SIGNIFICANT CHANGE UP (ref 3.5–5.3)
POTASSIUM SERPL-MCNC: 4.6 MMOL/L — SIGNIFICANT CHANGE UP (ref 3.5–5.3)
POTASSIUM SERPL-SCNC: 3.6 MMOL/L — SIGNIFICANT CHANGE UP (ref 3.5–5.3)
POTASSIUM SERPL-SCNC: 3.7 MMOL/L — SIGNIFICANT CHANGE UP (ref 3.5–5.3)
POTASSIUM SERPL-SCNC: 3.8 MMOL/L — SIGNIFICANT CHANGE UP (ref 3.5–5.3)
POTASSIUM SERPL-SCNC: 3.9 MMOL/L — SIGNIFICANT CHANGE UP (ref 3.5–5.3)
POTASSIUM SERPL-SCNC: 4.6 MMOL/L — SIGNIFICANT CHANGE UP (ref 3.5–5.3)
PROT SERPL-MCNC: 6.2 G/DL — SIGNIFICANT CHANGE UP (ref 6–8.3)
RBC # BLD: 3.71 M/UL — LOW (ref 4.2–5.8)
RBC # FLD: 12.7 % — SIGNIFICANT CHANGE UP (ref 10.3–14.5)
SODIUM SERPL-SCNC: 120 MMOL/L — CRITICAL LOW (ref 135–145)
SODIUM SERPL-SCNC: 122 MMOL/L — LOW (ref 135–145)
SODIUM SERPL-SCNC: 122 MMOL/L — LOW (ref 135–145)
SODIUM SERPL-SCNC: 124 MMOL/L — LOW (ref 135–145)
SODIUM SERPL-SCNC: 124 MMOL/L — LOW (ref 135–145)
SODIUM UR-SCNC: 26 MMOL/L — SIGNIFICANT CHANGE UP
SODIUM UR-SCNC: 43 MMOL/L — SIGNIFICANT CHANGE UP
SODIUM UR-SCNC: 50 MMOL/L — SIGNIFICANT CHANGE UP
WBC # BLD: 9.11 K/UL — SIGNIFICANT CHANGE UP (ref 3.8–10.5)
WBC # FLD AUTO: 9.11 K/UL — SIGNIFICANT CHANGE UP (ref 3.8–10.5)

## 2024-08-16 PROCEDURE — 76937 US GUIDE VASCULAR ACCESS: CPT | Mod: 26,59

## 2024-08-16 PROCEDURE — 99291 CRITICAL CARE FIRST HOUR: CPT

## 2024-08-16 PROCEDURE — 95720 EEG PHY/QHP EA INCR W/VEEG: CPT

## 2024-08-16 PROCEDURE — 99233 SBSQ HOSP IP/OBS HIGH 50: CPT

## 2024-08-16 PROCEDURE — 71045 X-RAY EXAM CHEST 1 VIEW: CPT | Mod: 26

## 2024-08-16 PROCEDURE — 36569 INSJ PICC 5 YR+ W/O IMAGING: CPT

## 2024-08-16 PROCEDURE — 93010 ELECTROCARDIOGRAM REPORT: CPT

## 2024-08-16 RX ORDER — AZITHROMYCIN 500 MG/1
500 TABLET, FILM COATED ORAL ONCE
Refills: 0 | Status: COMPLETED | OUTPATIENT
Start: 2024-08-16 | End: 2024-08-16

## 2024-08-16 RX ORDER — OLANZAPINE 7.5 MG/1
5 TABLET ORAL ONCE
Refills: 0 | Status: COMPLETED | OUTPATIENT
Start: 2024-08-16 | End: 2024-08-16

## 2024-08-16 RX ORDER — LORAZEPAM 4 MG/ML
1 INJECTION INTRAMUSCULAR; INTRAVENOUS ONCE
Refills: 0 | Status: DISCONTINUED | OUTPATIENT
Start: 2024-08-16 | End: 2024-08-16

## 2024-08-16 RX ORDER — DESMOPRESSIN ACETATE 4 UG/ML
2 INJECTION, SOLUTION INTRAVENOUS; SUBCUTANEOUS
Refills: 0 | Status: DISCONTINUED | OUTPATIENT
Start: 2024-08-16 | End: 2024-08-16

## 2024-08-16 RX ORDER — POTASSIUM CHLORIDE 10 MEQ
10 TABLET, EXT RELEASE, PARTICLES/CRYSTALS ORAL
Refills: 0 | Status: COMPLETED | OUTPATIENT
Start: 2024-08-16 | End: 2024-08-16

## 2024-08-16 RX ORDER — SODIUM PHOSPHATE, MONOBASIC, MONOHYDRATE AND SODIUM PHOSPHATE, DIBASIC ANHYDROUS 276; 142 MG/ML; MG/ML
30 INJECTION, SOLUTION INTRAVENOUS ONCE
Refills: 0 | Status: COMPLETED | OUTPATIENT
Start: 2024-08-16 | End: 2024-08-16

## 2024-08-16 RX ORDER — OLANZAPINE 7.5 MG/1
5 TABLET ORAL ONCE
Refills: 0 | Status: DISCONTINUED | OUTPATIENT
Start: 2024-08-16 | End: 2024-08-16

## 2024-08-16 RX ORDER — SODIUM CHLORIDE 3 G/100ML
500 INJECTION, SOLUTION INTRAVENOUS
Refills: 0 | Status: DISCONTINUED | OUTPATIENT
Start: 2024-08-16 | End: 2024-08-17

## 2024-08-16 RX ORDER — AZITHROMYCIN 500 MG/1
TABLET, FILM COATED ORAL
Refills: 0 | Status: DISCONTINUED | OUTPATIENT
Start: 2024-08-16 | End: 2024-08-17

## 2024-08-16 RX ORDER — HALOPERIDOL 1 MG
2 TABLET ORAL ONCE
Refills: 0 | Status: COMPLETED | OUTPATIENT
Start: 2024-08-16 | End: 2024-08-16

## 2024-08-16 RX ORDER — LORAZEPAM 4 MG/ML
2 INJECTION INTRAMUSCULAR; INTRAVENOUS ONCE
Refills: 0 | Status: DISCONTINUED | OUTPATIENT
Start: 2024-08-16 | End: 2024-08-16

## 2024-08-16 RX ORDER — DESMOPRESSIN ACETATE 4 UG/ML
1 INJECTION, SOLUTION INTRAVENOUS; SUBCUTANEOUS
Refills: 0 | Status: DISCONTINUED | OUTPATIENT
Start: 2024-08-16 | End: 2024-08-17

## 2024-08-16 RX ORDER — AZITHROMYCIN 500 MG/1
500 TABLET, FILM COATED ORAL EVERY 24 HOURS
Refills: 0 | Status: DISCONTINUED | OUTPATIENT
Start: 2024-08-17 | End: 2024-08-17

## 2024-08-16 RX ORDER — POTASSIUM CHLORIDE 10 MEQ
40 TABLET, EXT RELEASE, PARTICLES/CRYSTALS ORAL ONCE
Refills: 0 | Status: DISCONTINUED | OUTPATIENT
Start: 2024-08-16 | End: 2024-08-16

## 2024-08-16 RX ADMIN — Medication 100 MILLIEQUIVALENT(S): at 13:18

## 2024-08-16 RX ADMIN — DESMOPRESSIN ACETATE 2 MICROGRAM(S): 4 INJECTION, SOLUTION INTRAVENOUS; SUBCUTANEOUS at 06:27

## 2024-08-16 RX ADMIN — AZITHROMYCIN 500 MILLIGRAM(S): 500 TABLET, FILM COATED ORAL at 21:23

## 2024-08-16 RX ADMIN — Medication 250 MILLILITER(S): at 06:47

## 2024-08-16 RX ADMIN — ENOXAPARIN SODIUM 40 MILLIGRAM(S): 100 INJECTION SUBCUTANEOUS at 06:47

## 2024-08-16 RX ADMIN — DESMOPRESSIN ACETATE 1 MICROGRAM(S): 4 INJECTION, SOLUTION INTRAVENOUS; SUBCUTANEOUS at 22:48

## 2024-08-16 RX ADMIN — LORAZEPAM 1 MILLIGRAM(S): 4 INJECTION INTRAMUSCULAR; INTRAVENOUS at 13:10

## 2024-08-16 RX ADMIN — Medication 100 MILLIEQUIVALENT(S): at 14:45

## 2024-08-16 RX ADMIN — Medication 2 MILLIGRAM(S): at 01:17

## 2024-08-16 RX ADMIN — LORAZEPAM 1 MILLIGRAM(S): 4 INJECTION INTRAMUSCULAR; INTRAVENOUS at 11:10

## 2024-08-16 RX ADMIN — DEXMEDETOMIDINE HYDROCHLORIDE IN 0.9% SODIUM CHLORIDE 3.4 MICROGRAM(S)/KG/HR: 4 INJECTION INTRAVENOUS at 22:18

## 2024-08-16 RX ADMIN — Medication 100 MILLIEQUIVALENT(S): at 10:06

## 2024-08-16 RX ADMIN — SODIUM CHLORIDE 30 MILLILITER(S): 3 INJECTION, SOLUTION INTRAVENOUS at 17:19

## 2024-08-16 RX ADMIN — LORAZEPAM 1 MILLIGRAM(S): 4 INJECTION INTRAMUSCULAR; INTRAVENOUS at 07:59

## 2024-08-16 RX ADMIN — DESMOPRESSIN ACETATE 2 MICROGRAM(S): 4 INJECTION, SOLUTION INTRAVENOUS; SUBCUTANEOUS at 10:29

## 2024-08-16 RX ADMIN — DEXMEDETOMIDINE HYDROCHLORIDE IN 0.9% SODIUM CHLORIDE 3.4 MICROGRAM(S)/KG/HR: 4 INJECTION INTRAVENOUS at 11:34

## 2024-08-16 RX ADMIN — OLANZAPINE 5 MILLIGRAM(S): 7.5 TABLET ORAL at 12:45

## 2024-08-16 RX ADMIN — DESMOPRESSIN ACETATE 2 MICROGRAM(S): 4 INJECTION, SOLUTION INTRAVENOUS; SUBCUTANEOUS at 16:10

## 2024-08-16 RX ADMIN — DEXMEDETOMIDINE HYDROCHLORIDE IN 0.9% SODIUM CHLORIDE 3.4 MICROGRAM(S)/KG/HR: 4 INJECTION INTRAVENOUS at 06:48

## 2024-08-16 RX ADMIN — SODIUM PHOSPHATE, MONOBASIC, MONOHYDRATE AND SODIUM PHOSPHATE, DIBASIC ANHYDROUS 85 MILLIMOLE(S): 276; 142 INJECTION, SOLUTION INTRAVENOUS at 11:35

## 2024-08-16 RX ADMIN — Medication 100 MILLIEQUIVALENT(S): at 11:35

## 2024-08-16 RX ADMIN — Medication 100 MILLIGRAM(S): at 19:25

## 2024-08-16 RX ADMIN — Medication 1000 MILLILITER(S): at 06:47

## 2024-08-16 RX ADMIN — CHLORHEXIDINE GLUCONATE 1 APPLICATION(S): 40 SOLUTION TOPICAL at 06:28

## 2024-08-16 NOTE — DIETITIAN INITIAL EVALUATION ADULT - OTHER INFO
Patient is 44 YOM with no significant PMH who presents from [facility] w seizures likely 2/2 symptomatic hyponatremia. Patient remains altered, monitoring closely with regular CMP given potential rapid overcorrection.    Pt care discussed in interdisciplinary care team rounds. Rx and labs reviewed. Pt presents with no overt signs of nutritional wasting. Pt remains altered, not able to participate in nutrition interview. Pt remains hyponatremic today at 120; continues on hypertonic saline. No plan for nutrition at this time; see recs below. No other reports GI distress or further nutritional concerns at this time. RDN to remain available, see recommendations below.     Pain: No non-verbal indicators  GI: Abdomen non-distended/non-tender, +BS x4, last bowel movement 8/15  Skin: Warm/Dry/Intact, no edema assessed

## 2024-08-16 NOTE — PROGRESS NOTE ADULT - ASSESSMENT
Patient is 44 YOM with no significant PMH who presents from [facility] w seizures likely 2/2 symptomatic hyponatremia. Patient remains altered, monitoring closely with regular CMP given potential rapid overcorrection.    NEURO  #Encephalopathic  AMS 2/2 symptomatic hyponatremia  s/p 8mg Ativan given agitation    - c/w Precedex drip for now given agitation  - correcting Na as below    CV  ERICK      PULM  ERICK      RENAL  #Hyponatremia  Patient with severe hypoNa to 116 after drinking a large amount of tap water d/t marijuana intoxication  Serum osmolarity and urine studies indicated of primary polydipsia  Patients can rapidly overcorrect, monitor closely  s/p 100cc hypertonic saline in [facility] ED    - Q2H CMP  - started 2mcg DDAVP Q6H  - goal is 124 by 11AM tomorrow morning  - D5W boluses as needed to maintain goal    #I&O  Castillo in place  - monitor closely     GI  ERICK      ENDO  ERICK      ID:  #Leukocytosis  Noted to have leukocytosis in ED, no fevers  Likely reactive iso seizures    - monitor fever curve and WBC      HEME/ONC  ERICK        SKIN:  #Lines: PIV      PREVENTIVE   F: D5W as needed  E: K>4, Mg>2  N: NPO  GI: none  DVT: lovenox 40mg  DISPO: MICU       Patient is 44 YOM with no significant PMH who presents from [facility] w seizures likely 2/2 symptomatic hyponatremia. Patient remains altered, monitoring closely with regular CMP given potential rapid overcorrection.    NEURO  #Encephalopathic  AMS 2/2 symptomatic hyponatremia  s/p 8mg Ativan given agitation  vEEG w/o seizure activity    - downtitrating precedex drip   - correcting Na as below    CV  ERICK      PULM  ERICK      RENAL  #Hyponatremia  Patient with severe hypoNa to 116 after drinking a large amount of tap water d/t marijuana intoxication  Serum osmolarity and urine studies indicated of primary polydipsia  Patients can rapidly overcorrect, monitor closely  s/p 100cc hypertonic saline in [facility] ED    - Q4H BMP  - Q4H urine studies w/ BMP  - started 2mcg DDAVP Q6H  - goal is 124 by 11AM tomorrow morning  - D5W boluses as needed to maintain goal    #I&O  Castillo in place  - monitor w strict I&Os    GI  ERICK      ENDO  ERICK      ID:  #Leukocytosis  Noted to have leukocytosis in ED, no fevers  Likely reactive iso seizures  s/p CTX and vanc in [facility] d/t concerns for meningitis    - monitor fever curve and WBC      HEME/ONC  ERICK        SKIN:  #Lines: PIV      PREVENTIVE   F: D5W as needed to prevent overcorrection  E: K>4, Mg>2  N: NPO  GI: none  DVT: lovenox 40mg  DISPO: MICU

## 2024-08-16 NOTE — EEG REPORT - NS EEG TEXT BOX
[facility] Department of Neurology  Inpatient Continuous video-Electroencephalogram      Patient Name:	YASH WELLINGTON    :	1980  MRN:	[id number]    Study Start Date/Time:	8/15/2024, 5:20:04 PM  Study End Date/Time: in progress    Referred by:  Denny Melendez MD    Brief Clinical History:  YASH WELLINGTON is a 44-year-old man with hyponatremic seizure; study performed to investigate for seizures or markers of epilepsy.     Diagnosis Code:  R56.9 convulsions/seizure    Pertinent Medication:  n/a    Acquisition Details:  Electroencephalography was acquired using a minimum of 21 channels on an [employer] system v 9.3.1 with electrode placement according to the standard International 10-20 system following ACNS (American Clinical Neurophysiology Society) guidelines.  Anterior temporal T1 and T2 electrodes were utilized whenever possible.  The [employer] automated spike & seizure detections were all reviewed in detail, in addition to the entire raw EEG.    Findings:  Day 1:  8/15/2024, 5:20:04 PM to next morning at 07:00 AM   Background:  continuous, with predominantly alpha, theta, and delta frequencies.  Generalized Slowing: Moderate   Symmetry/Focality: No persistent asymmetries of voltage or frequency.        Voltage:  Normal (20+ uV)  Organization:  Appropriate anterior-posterior gradient  Posterior Dominant Rhythm:  No clear PDR   Sleep:  Rudimentary but likely N2 transients present.  Variability:   Yes		Reactivity:  Yes    Spontaneous Activity:  No epileptiform discharges     Events:  1)	No electrographic seizures or significant clinical events occurred during this study.  Provocations:  •	Hyperventilation: was not performed.  •	Photic stimulation: was not performed.    Daily Summary:    1)	Moderate generalized background slowing, indicating a moderate degree of diffuse or multifocal cerebral dysfunction.  2)	There were no findings of active epilepsy, however this alone does not rule out the diagnosis.         Kaylee Rueda MD  Attending Neurologist, [facility]

## 2024-08-16 NOTE — DIETITIAN INITIAL EVALUATION ADULT - PERTINENT LABORATORY DATA
08-16    120<LL>  |  90<L>  |  4<L>  ----------------------------<  149<H>  3.6   |  21<L>  |  0.67    Ca    8.0<L>      16 Aug 2024 09:46  Phos  2.0     08-16  Mg     1.9     08-16    TPro  6.2  /  Alb  4.0  /  TBili  1.5<H>  /  DBili  x   /  AST  75<H>  /  ALT  28  /  AlkPhos  51  08-16

## 2024-08-16 NOTE — DIETITIAN INITIAL EVALUATION ADULT - PERTINENT MEDS FT
MEDICATIONS  (STANDING):  chlorhexidine 2% Cloths 1 Application(s) Topical <User Schedule>  dexMEDEtomidine Infusion 0.2 MICROgram(s)/kG/Hr (3.4 mL/Hr) IV Continuous <Continuous>  enoxaparin Injectable 40 milliGRAM(s) SubCutaneous every 24 hours  potassium chloride  10 mEq/100 mL IVPB 10 milliEquivalent(s) IV Intermittent every 1 hour    MEDICATIONS  (PRN):

## 2024-08-16 NOTE — PROCEDURE NOTE - NSINFORMCONSENT_GEN_A_CORE
Per spouse/Benefits, risks, and possible complications of procedure explained to patient/caregiver who verbalized understanding and gave verbal consent.

## 2024-08-16 NOTE — PROCEDURE NOTE - NSICDXPROCEDURE_GEN_ALL_CORE_FT
PROCEDURES:  Insertion of intravenous catheter with ultrasound guidance 16-Aug-2024 11:36:36  David Alonzo

## 2024-08-16 NOTE — PROGRESS NOTE ADULT - SUBJECTIVE AND OBJECTIVE BOX
NEPHROLOGY PROGRESS NOTE:    Interval history:  No overnight events. Patient seen at bedside, wife was present. Patient agitated.    Vitals:  T(F): 100.3 (24 @ 13:36), Max: 100.3 (24 @ 13:36)  HR: 59 (24 @ 15:00)  BP: 128/76 (24 @ 14:00)  RR: 14 (24 @ 10:00)  SpO2: 94% (24 @ 15:00)  Wt(kg): --    08-15 @ 07:01  -   @ 07:00  --------------------------------------------------------  IN: 3136.8 mL / OUT: 6165 mL / NET: -3028.2 mL     @ 07:01  -   @ 17:19  --------------------------------------------------------  IN: 1762.3 mL / OUT: 210 mL / NET: 1552.3 mL      Height (cm): 180 ( @ 00:00)    PE: DIFFICULT DUE TO AGITATION    Pertinent labs & Imagin-16    122<L>  |  93<L>  |  5<L>  ----------------------------<  120<H>  4.6   |  18<L>  |  0.65    Ca    8.1<L>      16 Aug 2024 15:36  Phos  2.0       Mg     1.9         TPro  6.2  /  Alb  4.0  /  TBili  1.5<H>  /  DBili      /  AST  75<H>  /  ALT  28  /  AlkPhos  51                            12.4   9.11  )-----------( 194      ( 16 Aug 2024 05:30 )             33.1       Urine Studies:  Creatinine Trend: 0.65<--, 0.67<--, 0.75<--, 0.73<--, 0.75<--, 0.67<--  Urinalysis Basic - ( 16 Aug 2024 15:36 )    Color:  / Appearance:  / SG:  / pH:   Gluc: 120 mg/dL / Ketone:   / Bili:  / Urobili:    Blood:  / Protein:  / Nitrite:    Leuk Esterase:  / RBC:  / WBC    Sq Epi:  / Non Sq Epi:  / Bacteria:       Sodium, Random Urine: 43 mmol/L (08-16 @ 15:36)  Osmolality, Random Urine: 759 mosm/kg (08-16 @ 15:36)  Creatinine, Random Urine: 370 mg/dL (08-16 @ 15:36)  Osmolality, Random Urine: 680 mosm/kg ( @ 09:46)  Sodium, Random Urine: 26 mmol/L ( 09:46)  Creatinine, Random Urine: 345 mg/dL ( 09:46)  Sodium, Random Urine: 50 mmol/L ( @ 01:07)  Osmolality, Random Urine: 759 mosm/kg ( @ 01:07)  Creatinine, Random Urine: 272 mg/dL ( @ 01:07)  Sodium, Random Urine: <20 mmol/L (08-15 @ 16:45)  Creatinine, Random Urine: 5 mg/dL (08-15 @ 16:45)  Protein/Creatinine Ratio Calculation: <0.8 Ratio (08-15 @ 16:45)  Osmolality, Random Urine: 60 mosm/kg (08-15 @ 16:45)  Potassium, Random Urine: 4 mmol/L (08-15 @ 16:45)      MEDICATIONS  (STANDING):  chlorhexidine 2% Cloths 1 Application(s) Topical <User Schedule>  desmopressin Injectable 2 MICROGram(s) IV Push <User Schedule>  dexMEDEtomidine Infusion 0.2 MICROgram(s)/kG/Hr (3.4 mL/Hr) IV Continuous <Continuous>  enoxaparin Injectable 40 milliGRAM(s) SubCutaneous every 24 hours  sodium chloride 3%. 500 milliLiter(s) (30 mL/Hr) IV Continuous <Continuous>    MEDICATIONS  (PRN):

## 2024-08-16 NOTE — DIETITIAN INITIAL EVALUATION ADULT - ADD RECOMMEND
-Initiate nutrition within 24-48hrs    *Recommend regular diet with appropriate textures/consistencies  -Encourage good PO intake when appropriate   -Align nutrition with goals of care at all times  -Weekly wts  -Monitor chemistry, GI function, and skin integrity

## 2024-08-16 NOTE — PROGRESS NOTE ADULT - SUBJECTIVE AND OBJECTIVE BOX
OVERNIGHT EVENTS:     SUBJECTIVE:  The patient was seen and examined at the bedside this AM.     VITAL SIGNS:  Vital Signs Last 24 Hrs  T(C): 37.1 (16 Aug 2024 05:11), Max: 37.5 (16 Aug 2024 01:32)  T(F): 98.7 (16 Aug 2024 05:11), Max: 99.5 (16 Aug 2024 01:32)  HR: 54 (16 Aug 2024 06:00) (54 - 104)  BP: 128/78 (16 Aug 2024 06:00) (98/53 - 169/73)  BP(mean): 98 (16 Aug 2024 06:00) (71 - 114)  RR: 14 (16 Aug 2024 07:00) (14 - 22)  SpO2: 97% (16 Aug 2024 06:00) (88% - 99%)    Parameters below as of 16 Aug 2024 07:00  Patient On (Oxygen Delivery Method): room air        PHYSICAL EXAM:  GEN: Resting comfortably in NAD  ENMT: Moist oral mucosa; no conjunctival injection  RESP: No respiratory distress, no use of accessory muscles; CTA b/l, no WRR  CV: RRR, +S1S2, no MRG; no JVD; no peripheral edema  2+ radial, DP, PT   GI: Soft, NT, ND, no rebound, no guarding  : (((Castillo in place draining clear urine)))  MSK: No digital clubbing or cyanosis  SKIN: No rashes or ulcers noted  NEURO: Answers questions appropriately, moving all extremities spontaneously    MEDICATIONS:  MEDICATIONS  (STANDING):  chlorhexidine 2% Cloths 1 Application(s) Topical <User Schedule>  desmopressin Injectable 2 MICROGram(s) IV Push <User Schedule>  dexMEDEtomidine Infusion 0.2 MICROgram(s)/kG/Hr (3.4 mL/Hr) IV Continuous <Continuous>  dextrose 5%. 1000 milliLiter(s) (250 mL/Hr) IV Continuous <Continuous>  enoxaparin Injectable 40 milliGRAM(s) SubCutaneous every 24 hours    MEDICATIONS  (PRN):      ALLERGIES:  Allergies    No Known Allergies    Intolerances        LABS:                        12.4   9.11  )-----------( 194      ( 16 Aug 2024 05:30 )             33.1     08-16    124<L>  |  90<L>  |  4<L>  ----------------------------<  134<H>  3.7   |  23  |  0.75    Ca    8.2<L>      16 Aug 2024 05:30  Phos  2.0     08-16  Mg     1.9     08-16    TPro  6.2  /  Alb  4.0  /  TBili  1.5<H>  /  DBili  x   /  AST  75<H>  /  ALT  28  /  AlkPhos  51  08-16    PT/INR - ( 15 Aug 2024 16:28 )   PT: 11.2 sec;   INR: 0.98          PTT - ( 15 Aug 2024 16:28 )  PTT:28.1 sec      RADIOLOGY & ADDITIONAL TESTS: Reviewed. OVERNIGHT EVENTS:   Held 10pm ddavp as no UOP. Haldol 2 IV x1 @1am for agitation. Na 122. Desmo DC'd. Fluids off at 130AM. 500am had 700cc of UOP, bolused 250cc of D5W and continued D5W at 250cc/hr. Restarted desmopressin later. Repeat AM Na 124.    SUBJECTIVE:  The patient was seen and examined at the bedside this AM. Remains on precedex drip, agitation calmed from yesterday, able to form sentences. ROS limited.    VITAL SIGNS:  Vital Signs Last 24 Hrs  T(C): 37.1 (16 Aug 2024 05:11), Max: 37.5 (16 Aug 2024 01:32)  T(F): 98.7 (16 Aug 2024 05:11), Max: 99.5 (16 Aug 2024 01:32)  HR: 54 (16 Aug 2024 06:00) (54 - 104)  BP: 128/78 (16 Aug 2024 06:00) (98/53 - 169/73)  BP(mean): 98 (16 Aug 2024 06:00) (71 - 114)  RR: 14 (16 Aug 2024 07:00) (14 - 22)  SpO2: 97% (16 Aug 2024 06:00) (88% - 99%)    Parameters below as of 16 Aug 2024 07:00  Patient On (Oxygen Delivery Method): room air    PHYSICAL EXAM:  GEN: Obtunded; on precedex drip  ENMT: Moist oral mucosa; no conjunctival injection, pupils large b/l but reactive  RESP: No respiratory distress, no use of accessory muscles; CTA b/l, no WRR  CV: RRR, +S1S2, no MRG; no JVD; no peripheral edema  2+ radial, DP, PT   GI: Soft, NT, ND, no rebound, no guarding  : Castillo in place draining entirely clear urine  MSK: No digital clubbing or cyanosis  SKIN: No rashes or ulcers noted  NEURO: Altered, currently non-responsive to questions, moving all extremities spontaneously    MEDICATIONS:  MEDICATIONS  (STANDING):  chlorhexidine 2% Cloths 1 Application(s) Topical <User Schedule>  desmopressin Injectable 2 MICROGram(s) IV Push <User Schedule>  dexMEDEtomidine Infusion 0.2 MICROgram(s)/kG/Hr (3.4 mL/Hr) IV Continuous <Continuous>  dextrose 5%. 1000 milliLiter(s) (250 mL/Hr) IV Continuous <Continuous>  enoxaparin Injectable 40 milliGRAM(s) SubCutaneous every 24 hours    MEDICATIONS  (PRN):      ALLERGIES:  Allergies    No Known Allergies    Intolerances        LABS:                        12.4   9.11  )-----------( 194      ( 16 Aug 2024 05:30 )             33.1     08-16    124<L>  |  90<L>  |  4<L>  ----------------------------<  134<H>  3.7   |  23  |  0.75    Ca    8.2<L>      16 Aug 2024 05:30  Phos  2.0     08-16  Mg     1.9     08-16    TPro  6.2  /  Alb  4.0  /  TBili  1.5<H>  /  DBili  x   /  AST  75<H>  /  ALT  28  /  AlkPhos  51  08-16    PT/INR - ( 15 Aug 2024 16:28 )   PT: 11.2 sec;   INR: 0.98          PTT - ( 15 Aug 2024 16:28 )  PTT:28.1 sec      RADIOLOGY & ADDITIONAL TESTS: Reviewed.

## 2024-08-16 NOTE — PROGRESS NOTE ADULT - ASSESSMENT
5 y/o M w no PMH transferred from [facility] with AMS and seizure like activity. Took 40mg of THC and 1000mg CBD last night plus a glass of wine and wake up this morning feeling dizzy, then started drinking water, as per pt's wife: "he drank water for 2 and a half hours straight and started vomiting and having abnormal behaviour, he was slightly out of it, and unsteady on his feet". Wife states that he does not have a seizure hx, and this has never happened before. When EMS got to the scene pt was combative. Corrected Na upon arrival this am was 117meq, now pte in ICU with Na 124, corrected 7 meq in 5h period, Nephrology consulted.     1- Symptomatic severe hyponatremia: SNa 116 > 124 > 120 [In 24h], s/p NaCl 3% 240mL total infused volume, DDAVP and D5W 250mL bolus  Will treat as chronic hyponatremia using a correction rate of 4-6h for a 24h period.   Aiming for serum Na of ~128meq for 8/17 at 11am, not higher than 130meq (upper limit of correction).   Stop DDVAP  Fluid restriction <1L/d  Monitor BMP, urine osmolarity off DDAVP every 4 hours  If SNa above limits, would consider resuming DDAVP along with D5W bolus  Strict I/O monitoring    2-Non-oliguric GAB: likely prerenal azotemia now RESOLVED      Thank you for consulting Nephrology. We will continue to follow the patient closely and provide recommendations as needed.    Riaz Stevens MD  PGY-4 Nephrology Fellow

## 2024-08-16 NOTE — DIETITIAN INITIAL EVALUATION ADULT - OTHER CALCULATIONS
Fluids per team. Needs determined using current body weight 68 kg (<100%IBW in ICU) adjusted for maintenance.

## 2024-08-17 LAB
ADD ON TEST-SPECIMEN IN LAB: SIGNIFICANT CHANGE UP
ADD ON TEST-SPECIMEN IN LAB: SIGNIFICANT CHANGE UP
ALBUMIN SERPL ELPH-MCNC: 3.4 G/DL — SIGNIFICANT CHANGE UP (ref 3.3–5)
ALBUMIN SERPL ELPH-MCNC: 3.6 G/DL — SIGNIFICANT CHANGE UP (ref 3.3–5)
ALBUMIN SERPL ELPH-MCNC: 3.6 G/DL — SIGNIFICANT CHANGE UP (ref 3.3–5)
ALP SERPL-CCNC: 46 U/L — SIGNIFICANT CHANGE UP (ref 40–120)
ALP SERPL-CCNC: 52 U/L — SIGNIFICANT CHANGE UP (ref 40–120)
ALP SERPL-CCNC: 54 U/L — SIGNIFICANT CHANGE UP (ref 40–120)
ALT FLD-CCNC: 50 U/L — HIGH (ref 10–45)
ALT FLD-CCNC: 72 U/L — HIGH (ref 10–45)
ALT FLD-CCNC: 94 U/L — HIGH (ref 10–45)
ANION GAP SERPL CALC-SCNC: 10 MMOL/L — SIGNIFICANT CHANGE UP (ref 5–17)
ANION GAP SERPL CALC-SCNC: 11 MMOL/L — SIGNIFICANT CHANGE UP (ref 5–17)
ANION GAP SERPL CALC-SCNC: 11 MMOL/L — SIGNIFICANT CHANGE UP (ref 5–17)
ANION GAP SERPL CALC-SCNC: 12 MMOL/L — SIGNIFICANT CHANGE UP (ref 5–17)
ANION GAP SERPL CALC-SCNC: 12 MMOL/L — SIGNIFICANT CHANGE UP (ref 5–17)
AST SERPL-CCNC: 350 U/L — HIGH (ref 10–40)
AST SERPL-CCNC: 577 U/L — HIGH (ref 10–40)
AST SERPL-CCNC: 787 U/L — HIGH (ref 10–40)
BASOPHILS # BLD AUTO: 0.03 K/UL — SIGNIFICANT CHANGE UP (ref 0–0.2)
BASOPHILS NFR BLD AUTO: 0.3 % — SIGNIFICANT CHANGE UP (ref 0–2)
BILIRUB SERPL-MCNC: 1.4 MG/DL — HIGH (ref 0.2–1.2)
BUN SERPL-MCNC: 3 MG/DL — LOW (ref 7–23)
BUN SERPL-MCNC: 4 MG/DL — LOW (ref 7–23)
BUN SERPL-MCNC: 5 MG/DL — LOW (ref 7–23)
CALCIUM SERPL-MCNC: 7.7 MG/DL — LOW (ref 8.4–10.5)
CALCIUM SERPL-MCNC: 7.7 MG/DL — LOW (ref 8.4–10.5)
CALCIUM SERPL-MCNC: 7.9 MG/DL — LOW (ref 8.4–10.5)
CHLORIDE SERPL-SCNC: 91 MMOL/L — LOW (ref 96–108)
CHLORIDE SERPL-SCNC: 92 MMOL/L — LOW (ref 96–108)
CHLORIDE SERPL-SCNC: 94 MMOL/L — LOW (ref 96–108)
CHLORIDE SERPL-SCNC: 95 MMOL/L — LOW (ref 96–108)
CHLORIDE SERPL-SCNC: 97 MMOL/L — SIGNIFICANT CHANGE UP (ref 96–108)
CK SERPL-CCNC: CRITICAL HIGH U/L (ref 30–200)
CK SERPL-CCNC: CRITICAL HIGH U/L (ref 30–200)
CO2 SERPL-SCNC: 18 MMOL/L — LOW (ref 22–31)
CO2 SERPL-SCNC: 19 MMOL/L — LOW (ref 22–31)
CO2 SERPL-SCNC: 20 MMOL/L — LOW (ref 22–31)
CO2 SERPL-SCNC: 20 MMOL/L — LOW (ref 22–31)
CO2 SERPL-SCNC: 21 MMOL/L — LOW (ref 22–31)
CREAT SERPL-MCNC: 0.65 MG/DL — SIGNIFICANT CHANGE UP (ref 0.5–1.3)
CREAT SERPL-MCNC: 0.65 MG/DL — SIGNIFICANT CHANGE UP (ref 0.5–1.3)
CREAT SERPL-MCNC: 0.67 MG/DL — SIGNIFICANT CHANGE UP (ref 0.5–1.3)
CREAT SERPL-MCNC: 0.7 MG/DL — SIGNIFICANT CHANGE UP (ref 0.5–1.3)
CREAT SERPL-MCNC: 0.7 MG/DL — SIGNIFICANT CHANGE UP (ref 0.5–1.3)
EGFR: 116 ML/MIN/1.73M2 — SIGNIFICANT CHANGE UP
EGFR: 116 ML/MIN/1.73M2 — SIGNIFICANT CHANGE UP
EGFR: 117 ML/MIN/1.73M2 — SIGNIFICANT CHANGE UP
EGFR: 118 ML/MIN/1.73M2 — SIGNIFICANT CHANGE UP
EGFR: 118 ML/MIN/1.73M2 — SIGNIFICANT CHANGE UP
EOSINOPHIL # BLD AUTO: 0.04 K/UL — SIGNIFICANT CHANGE UP (ref 0–0.5)
EOSINOPHIL NFR BLD AUTO: 0.3 % — SIGNIFICANT CHANGE UP (ref 0–6)
GLUCOSE SERPL-MCNC: 103 MG/DL — HIGH (ref 70–99)
GLUCOSE SERPL-MCNC: 113 MG/DL — HIGH (ref 70–99)
GLUCOSE SERPL-MCNC: 120 MG/DL — HIGH (ref 70–99)
GLUCOSE SERPL-MCNC: 134 MG/DL — HIGH (ref 70–99)
GLUCOSE SERPL-MCNC: 88 MG/DL — SIGNIFICANT CHANGE UP (ref 70–99)
HAV IGM SER-ACNC: SIGNIFICANT CHANGE UP
HBV CORE IGM SER-ACNC: SIGNIFICANT CHANGE UP
HBV SURFACE AG SER-ACNC: SIGNIFICANT CHANGE UP
HCT VFR BLD CALC: 35.4 % — LOW (ref 39–50)
HCV AB S/CO SERPL IA: 0.06 S/CO — SIGNIFICANT CHANGE UP
HCV AB SERPL-IMP: SIGNIFICANT CHANGE UP
HGB BLD-MCNC: 12.9 G/DL — LOW (ref 13–17)
IMM GRANULOCYTES NFR BLD AUTO: 0.3 % — SIGNIFICANT CHANGE UP (ref 0–0.9)
LEGIONELLA AG UR QL: NEGATIVE — SIGNIFICANT CHANGE UP
LYMPHOCYTES # BLD AUTO: 0.97 K/UL — LOW (ref 1–3.3)
LYMPHOCYTES # BLD AUTO: 8.3 % — LOW (ref 13–44)
MAGNESIUM SERPL-MCNC: 1.7 MG/DL — SIGNIFICANT CHANGE UP (ref 1.6–2.6)
MAGNESIUM SERPL-MCNC: 1.9 MG/DL — SIGNIFICANT CHANGE UP (ref 1.6–2.6)
MAGNESIUM SERPL-MCNC: 2 MG/DL — SIGNIFICANT CHANGE UP (ref 1.6–2.6)
MAGNESIUM SERPL-MCNC: 2.1 MG/DL — SIGNIFICANT CHANGE UP (ref 1.6–2.6)
MCHC RBC-ENTMCNC: 33.9 PG — SIGNIFICANT CHANGE UP (ref 27–34)
MCHC RBC-ENTMCNC: 36.4 GM/DL — HIGH (ref 32–36)
MCV RBC AUTO: 92.9 FL — SIGNIFICANT CHANGE UP (ref 80–100)
MONOCYTES # BLD AUTO: 0.82 K/UL — SIGNIFICANT CHANGE UP (ref 0–0.9)
MONOCYTES NFR BLD AUTO: 7 % — SIGNIFICANT CHANGE UP (ref 2–14)
NEUTROPHILS # BLD AUTO: 9.85 K/UL — HIGH (ref 1.8–7.4)
NEUTROPHILS NFR BLD AUTO: 83.8 % — HIGH (ref 43–77)
NRBC # BLD: 0 /100 WBCS — SIGNIFICANT CHANGE UP (ref 0–0)
PHOSPHATE SERPL-MCNC: 1.7 MG/DL — LOW (ref 2.5–4.5)
PHOSPHATE SERPL-MCNC: 2 MG/DL — LOW (ref 2.5–4.5)
PHOSPHATE SERPL-MCNC: 2.4 MG/DL — LOW (ref 2.5–4.5)
PHOSPHATE SERPL-MCNC: 4.4 MG/DL — SIGNIFICANT CHANGE UP (ref 2.5–4.5)
PLATELET # BLD AUTO: 157 K/UL — SIGNIFICANT CHANGE UP (ref 150–400)
POTASSIUM SERPL-MCNC: 3.5 MMOL/L — SIGNIFICANT CHANGE UP (ref 3.5–5.3)
POTASSIUM SERPL-MCNC: 3.7 MMOL/L — SIGNIFICANT CHANGE UP (ref 3.5–5.3)
POTASSIUM SERPL-MCNC: 3.7 MMOL/L — SIGNIFICANT CHANGE UP (ref 3.5–5.3)
POTASSIUM SERPL-MCNC: 4.3 MMOL/L — SIGNIFICANT CHANGE UP (ref 3.5–5.3)
POTASSIUM SERPL-MCNC: 4.3 MMOL/L — SIGNIFICANT CHANGE UP (ref 3.5–5.3)
POTASSIUM SERPL-SCNC: 3.5 MMOL/L — SIGNIFICANT CHANGE UP (ref 3.5–5.3)
POTASSIUM SERPL-SCNC: 3.7 MMOL/L — SIGNIFICANT CHANGE UP (ref 3.5–5.3)
POTASSIUM SERPL-SCNC: 3.7 MMOL/L — SIGNIFICANT CHANGE UP (ref 3.5–5.3)
POTASSIUM SERPL-SCNC: 4.3 MMOL/L — SIGNIFICANT CHANGE UP (ref 3.5–5.3)
POTASSIUM SERPL-SCNC: 4.3 MMOL/L — SIGNIFICANT CHANGE UP (ref 3.5–5.3)
PROT SERPL-MCNC: 5.7 G/DL — LOW (ref 6–8.3)
PROT SERPL-MCNC: 6 G/DL — SIGNIFICANT CHANGE UP (ref 6–8.3)
PROT SERPL-MCNC: 6.1 G/DL — SIGNIFICANT CHANGE UP (ref 6–8.3)
RBC # BLD: 3.81 M/UL — LOW (ref 4.2–5.8)
RBC # FLD: 13 % — SIGNIFICANT CHANGE UP (ref 10.3–14.5)
SODIUM SERPL-SCNC: 123 MMOL/L — LOW (ref 135–145)
SODIUM SERPL-SCNC: 124 MMOL/L — LOW (ref 135–145)
SODIUM SERPL-SCNC: 124 MMOL/L — LOW (ref 135–145)
SODIUM SERPL-SCNC: 126 MMOL/L — LOW (ref 135–145)
SODIUM SERPL-SCNC: 126 MMOL/L — LOW (ref 135–145)
WBC # BLD: 11.75 K/UL — HIGH (ref 3.8–10.5)
WBC # FLD AUTO: 11.75 K/UL — HIGH (ref 3.8–10.5)

## 2024-08-17 PROCEDURE — 99291 CRITICAL CARE FIRST HOUR: CPT

## 2024-08-17 PROCEDURE — 99232 SBSQ HOSP IP/OBS MODERATE 35: CPT

## 2024-08-17 PROCEDURE — 99231 SBSQ HOSP IP/OBS SF/LOW 25: CPT

## 2024-08-17 RX ORDER — DESMOPRESSIN ACETATE 4 UG/ML
2 INJECTION, SOLUTION INTRAVENOUS; SUBCUTANEOUS
Refills: 0 | Status: DISCONTINUED | OUTPATIENT
Start: 2024-08-17 | End: 2024-08-17

## 2024-08-17 RX ORDER — POTASSIUM CHLORIDE 10 MEQ
10 TABLET, EXT RELEASE, PARTICLES/CRYSTALS ORAL
Refills: 0 | Status: COMPLETED | OUTPATIENT
Start: 2024-08-17 | End: 2024-08-17

## 2024-08-17 RX ORDER — DESMOPRESSIN ACETATE 4 UG/ML
2 INJECTION, SOLUTION INTRAVENOUS; SUBCUTANEOUS ONCE
Refills: 0 | Status: COMPLETED | OUTPATIENT
Start: 2024-08-17 | End: 2024-08-17

## 2024-08-17 RX ORDER — SODIUM CHLORIDE 3 G/100ML
500 INJECTION, SOLUTION INTRAVENOUS
Refills: 0 | Status: DISCONTINUED | OUTPATIENT
Start: 2024-08-17 | End: 2024-08-17

## 2024-08-17 RX ORDER — SODIUM PHOSPHATE, MONOBASIC, MONOHYDRATE AND SODIUM PHOSPHATE, DIBASIC ANHYDROUS 276; 142 MG/ML; MG/ML
30 INJECTION, SOLUTION INTRAVENOUS ONCE
Refills: 0 | Status: COMPLETED | OUTPATIENT
Start: 2024-08-17 | End: 2024-08-17

## 2024-08-17 RX ORDER — POTASSIUM CHLORIDE 10 MEQ
20 TABLET, EXT RELEASE, PARTICLES/CRYSTALS ORAL
Refills: 0 | Status: DISCONTINUED | OUTPATIENT
Start: 2024-08-17 | End: 2024-08-17

## 2024-08-17 RX ADMIN — Medication 25 GRAM(S): at 08:36

## 2024-08-17 RX ADMIN — DEXMEDETOMIDINE HYDROCHLORIDE IN 0.9% SODIUM CHLORIDE 3.4 MICROGRAM(S)/KG/HR: 4 INJECTION INTRAVENOUS at 01:57

## 2024-08-17 RX ADMIN — DEXMEDETOMIDINE HYDROCHLORIDE IN 0.9% SODIUM CHLORIDE 3.4 MICROGRAM(S)/KG/HR: 4 INJECTION INTRAVENOUS at 06:21

## 2024-08-17 RX ADMIN — DESMOPRESSIN ACETATE 2 MICROGRAM(S): 4 INJECTION, SOLUTION INTRAVENOUS; SUBCUTANEOUS at 08:37

## 2024-08-17 RX ADMIN — Medication 100 MILLIGRAM(S): at 18:30

## 2024-08-17 RX ADMIN — Medication 500 MILLILITER(S): at 23:45

## 2024-08-17 RX ADMIN — Medication 100 MILLIEQUIVALENT(S): at 10:24

## 2024-08-17 RX ADMIN — SODIUM PHOSPHATE, MONOBASIC, MONOHYDRATE AND SODIUM PHOSPHATE, DIBASIC ANHYDROUS 85 MILLIMOLE(S): 276; 142 INJECTION, SOLUTION INTRAVENOUS at 10:39

## 2024-08-17 RX ADMIN — ENOXAPARIN SODIUM 40 MILLIGRAM(S): 100 INJECTION SUBCUTANEOUS at 06:05

## 2024-08-17 RX ADMIN — Medication 100 MILLIEQUIVALENT(S): at 09:19

## 2024-08-17 RX ADMIN — Medication 50 MILLIEQUIVALENT(S): at 05:05

## 2024-08-17 RX ADMIN — Medication 50 MILLIEQUIVALENT(S): at 07:06

## 2024-08-17 NOTE — PROGRESS NOTE ADULT - ASSESSMENT
Patient is 44 YOM with no significant PMH who [facility] likely 2/2 symptomatic hyponatremia. Patient remains altered, monitoring closely with regular CMP given potential rapid overcorrection.    NEURO  #Encephalopathic  AMS 2/2 symptomatic hyponatremia  s/p 8mg Ativan given agitation  vEEG w/o seizure activity    - downtitrating precedex drip   - correcting Na as below    CV  ERICK      PULM  ERICK      RENAL  #Hyponatremia  Patient with severe hypoNa to 116 after drinking a large amount of tap water d/t marijuana intoxication  Serum osmolarity and urine studies indicated of primary polydipsia  Patients can rapidly overcorrect, monitor closely  s/p 100cc hypertonic saline in [facility] ED    - Q4H BMP  - Q4H urine studies w/ BMP  - started 2mcg DDAVP Q6H  - goal is 124 by 11AM tomorrow morning  - D5W boluses as needed to maintain goal    #I&O  Castillo in place  - monitor w strict I&Os    GI  ERICK      ENDO  ERICK      ID:  #Leukocytosis  Noted to have leukocytosis in ED, no fevers  Likely reactive iso seizures  s/p CTX and [facility]anc [facility] d/t concerns for meningitis    - monitor fever curve and WBC      HEME/ONC  ERICK        SKIN:  #Lines: PIV      PREVENTIVE   F: D5W as needed to prevent overcorrection  E: K>4, Mg>2  N: NPO  GI: none  DVT: lovenox 40mg  DISPO: MICU       Patient is 44 YOM with no significant PMH who presents from [facility] w seizures likely 2/2 symptomatic hyponatremia. Patient neurologic status much improved this AM, AOx3, sodium remains within goal, continuing to improve.     NEURO  #Encephalopathic - resolved  AMS 2/2 symptomatic hyponatremia  s/p 8mg Ativan given agitation  vEEG w/o seizure activity    - downtitrating precedex drip; plan to remove 8/17  - correcting Na as below    CV  ERICK      PULM  #Aspiration PNA vs pneumonitis  Placed in Trendelenberg yesterday afternoon, spiked fevers soon after  CXR w RLL infiltrate, continued leukocytosis  Stopped azithromycin    - On RA this AM, CTM  - c/w CTX 1g    RENAL  #Hyponatremia  Patient with severe hypoNa to 116 after drinking a large amount of tap water d/t marijuana intoxication  Serum osmolarity and urine studies indicated of primary polydipsia  Much improved on 8/17 to 126, goal Na by 8/18 11AM is 134    - Q4H BMP  - Q4H urine studies w/ BMP  - stopped DDAVP and hyperNa for now  - Keep NPO for 8/17  - D5W boluses as needed to maintain goal    #I&O  Castillo in place  Exchange for condom catheter  - monitor w strict I&Os    GI  ERICK      ENDO  ERICK      ID:  #Like aspiration PNA vs pneumonitis    - c/w CTX 1g Q24 as above  - continue to monitor fever curve and WBC      HEME/ONC  ERICK        SKIN:  #Lines: PIV      PREVENTIVE   F: D5W as needed to prevent overcorrection  E: K>4, Mg>2  N: NPO  GI: none  DVT: lovenox 40mg  DISPO: MICU

## 2024-08-17 NOTE — PROGRESS NOTE ADULT - ASSESSMENT
45 y/o M w no PMH transferred from [facility] with AMS and seizure like activity. Took 40mg of THC and 1000mg CBD last night plus a glass of wine and wake up this morning feeling dizzy, then started drinking water, as per pt's wife: "he drank water for 2 and a half hours straight and started vomiting and having abnormal behaviour, he was slightly out of it, and unsteady on his feet". Wife states that he does not have a seizure hx, and this has never happened before. When EMS got to the scene pt was combative. Corrected Na upon arrival this am was 117meq, now pte in ICU with Na 124, corrected 7 meq in 5h period, Nephrology consulted.     1- Symptomatic severe hyponatremia: SNa 120 > 126 [24], SNa 124  Will treat as chronic hyponatremia using a correction rate of 4-6h for a 24h period.   Aiming for serum Na of ~132 meq for 8/18 at 11am, not higher than 134meq (upper limit of correction).   Urena 15gm tid  Fluid restriction <1L/d  Monitor BMP, urine osmolarity off DDAVP every 4 hours  If SNa above limits, would consider resuming DDAVP along with D5W bolus  Strict I/O monitoring    2-Rhabdomyolysis - Shi score 4 points: Low risk   Monitor CPK, BMP every 12 hours    Thank you for consulting Nephrology. We will continue to follow the patient closely and provide recommendations as needed.    Riaz Stevens MD  PGY-4 Nephrology Fellow

## 2024-08-17 NOTE — PROGRESS NOTE ADULT - SUBJECTIVE AND OBJECTIVE BOX
OVERNIGHT EVENTS:       SUBJECTIVE:  The patient was seen and examined at the bedside this AM.     VITAL SIGNS:  Vital Signs Last 24 Hrs  T(C): 36.7 (17 Aug 2024 05:25), Max: 37.9 (16 Aug 2024 13:36)  T(F): 98 (17 Aug 2024 05:25), Max: 100.3 (16 Aug 2024 13:36)  HR: 63 (17 Aug 2024 06:00) (54 - 78)  BP: 130/78 (17 Aug 2024 06:00) (111/67 - 172/86)  BP(mean): 98 (17 Aug 2024 06:00) (84 - 121)  RR: 18 (17 Aug 2024 06:00) (14 - 22)  SpO2: 97% (17 Aug 2024 06:00) (92% - 99%)    Parameters below as of 17 Aug 2024 07:00  Patient On (Oxygen Delivery Method): nasal cannula  O2 Flow (L/min): 2      PHYSICAL EXAM:  GEN: Resting comfortably in NAD  ENMT: Moist oral mucosa; no conjunctival injection  RESP: No respiratory distress, no use of accessory muscles; CTA b/l, no WRR  CV: RRR, +S1S2, no MRG; no JVD; no peripheral edema  2+ radial, DP, PT   GI: Soft, NT, ND, no rebound, no guarding  : (((Castillo in place draining clear urine)))  MSK: No digital clubbing or cyanosis  SKIN: No rashes or ulcers noted  NEURO: Answers questions appropriately, moving all extremities spontaneously    MEDICATIONS:  MEDICATIONS  (STANDING):  azithromycin  IVPB      azithromycin  IVPB 500 milliGRAM(s) IV Intermittent every 24 hours  cefTRIAXone   IVPB 1000 milliGRAM(s) IV Intermittent every 24 hours  chlorhexidine 2% Cloths 1 Application(s) Topical <User Schedule>  dexMEDEtomidine Infusion 0.2 MICROgram(s)/kG/Hr (3.4 mL/Hr) IV Continuous <Continuous>  enoxaparin Injectable 40 milliGRAM(s) SubCutaneous every 24 hours  potassium chloride  20 mEq/100 mL IVPB 20 milliEquivalent(s) IV Intermittent every 2 hours    MEDICATIONS  (PRN):      ALLERGIES:  Allergies    No Known Allergies    Intolerances        LABS:                        12.4   9.11  )-----------( 194      ( 16 Aug 2024 05:30 )             33.1     08-17    124<L>  |  94<L>  |  5<L>  ----------------------------<  120<H>  3.5   |  18<L>  |  0.65    Ca    7.7<L>      17 Aug 2024 02:22  Phos  2.0     08-16  Mg     1.9     08-16    TPro  6.2  /  Alb  4.0  /  TBili  1.5<H>  /  DBili  x   /  AST  75<H>  /  ALT  28  /  AlkPhos  51  08-16    PT/INR - ( 15 Aug 2024 16:28 )   PT: 11.2 sec;   INR: 0.98          PTT - ( 15 Aug 2024 16:28 )  PTT:28.1 sec      RADIOLOGY & ADDITIONAL TESTS: Reviewed. OVERNIGHT EVENTS:   Still requiring NC, 100.3F temperature, presumed dx of aspiration PNA. CTX/Azithro started.     SUBJECTIVE:  The patient was seen and examined at the bedside this AM. Feels much better, denies fevers, chills, chest pain, palpitations, SOB, abdominal pain, nausea, vomiting, diarrhea.     VITAL SIGNS:  Vital Signs Last 24 Hrs  T(C): 36.7 (17 Aug 2024 05:25), Max: 37.9 (16 Aug 2024 13:36)  T(F): 98 (17 Aug 2024 05:25), Max: 100.3 (16 Aug 2024 13:36)  HR: 63 (17 Aug 2024 06:00) (54 - 78)  BP: 130/78 (17 Aug 2024 06:00) (111/67 - 172/86)  BP(mean): 98 (17 Aug 2024 06:00) (84 - 121)  RR: 18 (17 Aug 2024 06:00) (14 - 22)  SpO2: 97% (17 Aug 2024 06:00) (92% - 99%)    Parameters below as of 17 Aug 2024 07:00  Patient On (Oxygen Delivery Method): nasal cannula  O2 Flow (L/min): 2      PHYSICAL EXAM:  GEN: Resting comfortably in NAD  ENMT: Moist oral mucosa; no conjunctival injection  RESP: No respiratory distress, no use of accessory muscles; mild RLL crackles  CV: RRR, +S1S2, no MRG; no JVD; no peripheral edema  2+ radial, DP, PT   GI: Soft, NT, ND, no rebound, no guarding  : Castillo in place draining dark urine  MSK: No digital clubbing or cyanosis  SKIN: No rashes or ulcers noted  NEURO: AOx3, moving all extremities spontaneously    MEDICATIONS:  MEDICATIONS  (STANDING):  azithromycin  IVPB      azithromycin  IVPB 500 milliGRAM(s) IV Intermittent every 24 hours  cefTRIAXone   IVPB 1000 milliGRAM(s) IV Intermittent every 24 hours  chlorhexidine 2% Cloths 1 Application(s) Topical <User Schedule>  dexMEDEtomidine Infusion 0.2 MICROgram(s)/kG/Hr (3.4 mL/Hr) IV Continuous <Continuous>  enoxaparin Injectable 40 milliGRAM(s) SubCutaneous every 24 hours  potassium chloride  20 mEq/100 mL IVPB 20 milliEquivalent(s) IV Intermittent every 2 hours    MEDICATIONS  (PRN):      ALLERGIES:  Allergies    No Known Allergies    Intolerances        LABS:                        12.4   9.11  )-----------( 194      ( 16 Aug 2024 05:30 )             33.1     08-17    124<L>  |  94<L>  |  5<L>  ----------------------------<  120<H>  3.5   |  18<L>  |  0.65    Ca    7.7<L>      17 Aug 2024 02:22  Phos  2.0     08-16  Mg     1.9     08-16    TPro  6.2  /  Alb  4.0  /  TBili  1.5<H>  /  DBili  x   /  AST  75<H>  /  ALT  28  /  AlkPhos  51  08-16    PT/INR - ( 15 Aug 2024 16:28 )   PT: 11.2 sec;   INR: 0.98          PTT - ( 15 Aug 2024 16:28 )  PTT:28.1 sec      RADIOLOGY & ADDITIONAL TESTS: Reviewed.

## 2024-08-17 NOTE — PROGRESS NOTE ADULT - SUBJECTIVE AND OBJECTIVE BOX
NEPHROLOGY PROGRESS NOTE:    Interval history:  No overnight events. Patient seen and examined at bedside. Patient denies any active complaints.    Vitals:  T(F): 98.1 (24 @ 17:55), Max: 98.1 (24 @ 17:55)  HR: 85 (24 @ 20:00)  BP: 155/76 (24 @ 19:00)  RR: 11 (24 @ 20:00)  SpO2: 95% (24 @ 20:00)  Wt(kg): --    08-15 @ 07:01  -   @ 07:00  --------------------------------------------------------  IN: 3136.8 mL / OUT: 6165 mL / NET: -3028.2 mL     @ 07:01  -   @ 07:00  --------------------------------------------------------  IN: 2839.3 mL / OUT: 520 mL / NET: 2319.3 mL     @ 07:01  -   @ 20:54  --------------------------------------------------------  IN: 899.8 mL / OUT: 1465 mL / NET: -565.2 mL          PE:  General: Not in acute distress, well-nourished  Neck: No visible mas, No JVD noted  Chest: CTAP b/l, no use of accessory respiratory muscles  Heart: RRR, S1/S2 wnl, no MRG  Abdomen: Soft, nontender, nondistended,  no hepatosplenomegaly  Extremities: No clubbing, cyanosis or edema  Neuro:  Alert, no apparent focal deficits, answer questions appropriately      Pertinent labs & Imagin-17    124<L>  |  92<L>  |  4<L>  ----------------------------<  134<H>  4.3   |  21<L>  |  0.70    Ca    7.9<L>      17 Aug 2024 15:34  Phos  4.4       Mg     2.0         TPro  6.0  /  Alb  3.6  /  TBili  1.4<H>  /  DBili      /  AST  577<H>  /  ALT  72<H>  /  AlkPhos  54                            12.9   11.75 )-----------( 157      ( 17 Aug 2024 06:14 )             35.4       Urine Studies:  Creatinine Trend: 0.70<--, 0.65<--, 0.67<--, 0.65<--, 0.67<--, 0.65<--  Urinalysis Basic - ( 17 Aug 2024 15:34 )    Color:  / Appearance:  / SG:  / pH:   Gluc: 134 mg/dL / Ketone:   / Bili:  / Urobili:    Blood:  / Protein:  / Nitrite:    Leuk Esterase:  / RBC:  / WBC    Sq Epi:  / Non Sq Epi:  / Bacteria:       Sodium, Random Urine: 43 mmol/L ( @ 15:36)  Osmolality, Random Urine: 759 mosm/kg ( 15:36)  Creatinine, Random Urine: 370 mg/dL ( 15:36)  Osmolality, Random Urine: 680 mosm/kg ( @ 09:46)  Sodium, Random Urine: 26 mmol/L ( @ 09:46)  Creatinine, Random Urine: 345 mg/dL ( @ 09:46)  Sodium, Random Urine: 50 mmol/L ( @ 01:07)  Osmolality, Random Urine: 759 mosm/kg ( @ 01:07)  Creatinine, Random Urine: 272 mg/dL ( @ 01:07)  Sodium, Random Urine: <20 mmol/L (08-15 @ 16:45)  Creatinine, Random Urine: 5 mg/dL (08-15 @ 16:45)  Protein/Creatinine Ratio Calculation: <0.8 Ratio (08-15 @ 16:45)  Osmolality, Random Urine: 60 mosm/kg (08-15 @ 16:45)  Potassium, Random Urine: 4 mmol/L (08-15 @ 16:45)      MEDICATIONS  (STANDING):  cefTRIAXone   IVPB 1000 milliGRAM(s) IV Intermittent every 24 hours  chlorhexidine 2% Cloths 1 Application(s) Topical <User Schedule>  enoxaparin Injectable 40 milliGRAM(s) SubCutaneous every 24 hours    MEDICATIONS  (PRN):

## 2024-08-18 LAB
ALBUMIN SERPL ELPH-MCNC: 3.8 G/DL — SIGNIFICANT CHANGE UP (ref 3.3–5)
ALBUMIN SERPL ELPH-MCNC: 4 G/DL — SIGNIFICANT CHANGE UP (ref 3.3–5)
ALP SERPL-CCNC: 62 U/L — SIGNIFICANT CHANGE UP (ref 40–120)
ALP SERPL-CCNC: 67 U/L — SIGNIFICANT CHANGE UP (ref 40–120)
ALT FLD-CCNC: 121 U/L — HIGH (ref 10–45)
ALT FLD-CCNC: 138 U/L — HIGH (ref 10–45)
ANION GAP SERPL CALC-SCNC: 12 MMOL/L — SIGNIFICANT CHANGE UP (ref 5–17)
ANION GAP SERPL CALC-SCNC: 12 MMOL/L — SIGNIFICANT CHANGE UP (ref 5–17)
ANION GAP SERPL CALC-SCNC: 15 MMOL/L — SIGNIFICANT CHANGE UP (ref 5–17)
APPEARANCE UR: CLEAR — SIGNIFICANT CHANGE UP
AST SERPL-CCNC: 929 U/L — HIGH (ref 10–40)
AST SERPL-CCNC: 978 U/L — HIGH (ref 10–40)
BACTERIA # UR AUTO: NEGATIVE /HPF — SIGNIFICANT CHANGE UP
BASOPHILS # BLD AUTO: 0.03 K/UL — SIGNIFICANT CHANGE UP (ref 0–0.2)
BASOPHILS NFR BLD AUTO: 0.4 % — SIGNIFICANT CHANGE UP (ref 0–2)
BILIRUB SERPL-MCNC: 1 MG/DL — SIGNIFICANT CHANGE UP (ref 0.2–1.2)
BILIRUB SERPL-MCNC: 1.3 MG/DL — HIGH (ref 0.2–1.2)
BILIRUB UR-MCNC: NEGATIVE — SIGNIFICANT CHANGE UP
BUN SERPL-MCNC: 2 MG/DL — LOW (ref 7–23)
BUN SERPL-MCNC: 2 MG/DL — LOW (ref 7–23)
BUN SERPL-MCNC: 6 MG/DL — LOW (ref 7–23)
CALCIUM SERPL-MCNC: 8 MG/DL — LOW (ref 8.4–10.5)
CALCIUM SERPL-MCNC: 8.8 MG/DL — SIGNIFICANT CHANGE UP (ref 8.4–10.5)
CALCIUM SERPL-MCNC: 8.9 MG/DL — SIGNIFICANT CHANGE UP (ref 8.4–10.5)
CAST: 1 /LPF — SIGNIFICANT CHANGE UP (ref 0–4)
CHLORIDE SERPL-SCNC: 102 MMOL/L — SIGNIFICANT CHANGE UP (ref 96–108)
CHLORIDE SERPL-SCNC: 102 MMOL/L — SIGNIFICANT CHANGE UP (ref 96–108)
CHLORIDE SERPL-SCNC: 99 MMOL/L — SIGNIFICANT CHANGE UP (ref 96–108)
CK SERPL-CCNC: CRITICAL HIGH U/L (ref 30–200)
CO2 SERPL-SCNC: 20 MMOL/L — LOW (ref 22–31)
CO2 SERPL-SCNC: 21 MMOL/L — LOW (ref 22–31)
CO2 SERPL-SCNC: 23 MMOL/L — SIGNIFICANT CHANGE UP (ref 22–31)
COLOR SPEC: YELLOW — SIGNIFICANT CHANGE UP
CREAT SERPL-MCNC: 0.66 MG/DL — SIGNIFICANT CHANGE UP (ref 0.5–1.3)
CREAT SERPL-MCNC: 0.69 MG/DL — SIGNIFICANT CHANGE UP (ref 0.5–1.3)
CREAT SERPL-MCNC: 0.72 MG/DL — SIGNIFICANT CHANGE UP (ref 0.5–1.3)
DIFF PNL FLD: ABNORMAL
EGFR: 115 ML/MIN/1.73M2 — SIGNIFICANT CHANGE UP
EGFR: 116 ML/MIN/1.73M2 — SIGNIFICANT CHANGE UP
EGFR: 118 ML/MIN/1.73M2 — SIGNIFICANT CHANGE UP
EOSINOPHIL # BLD AUTO: 0.03 K/UL — SIGNIFICANT CHANGE UP (ref 0–0.5)
EOSINOPHIL NFR BLD AUTO: 0.4 % — SIGNIFICANT CHANGE UP (ref 0–6)
GLUCOSE BLDC GLUCOMTR-MCNC: 36 MG/DL — CRITICAL LOW (ref 70–99)
GLUCOSE BLDC GLUCOMTR-MCNC: 86 MG/DL — SIGNIFICANT CHANGE UP (ref 70–99)
GLUCOSE SERPL-MCNC: 83 MG/DL — SIGNIFICANT CHANGE UP (ref 70–99)
GLUCOSE SERPL-MCNC: 84 MG/DL — SIGNIFICANT CHANGE UP (ref 70–99)
GLUCOSE SERPL-MCNC: 99 MG/DL — SIGNIFICANT CHANGE UP (ref 70–99)
GLUCOSE UR QL: NEGATIVE MG/DL — SIGNIFICANT CHANGE UP
HBV SURFACE AB SER-ACNC: REACTIVE
HCT VFR BLD CALC: 37.8 % — LOW (ref 39–50)
HGB BLD-MCNC: 14 G/DL — SIGNIFICANT CHANGE UP (ref 13–17)
IMM GRANULOCYTES NFR BLD AUTO: 0.3 % — SIGNIFICANT CHANGE UP (ref 0–0.9)
KETONES UR-MCNC: 80 MG/DL
LEUKOCYTE ESTERASE UR-ACNC: NEGATIVE — SIGNIFICANT CHANGE UP
LYMPHOCYTES # BLD AUTO: 0.73 K/UL — LOW (ref 1–3.3)
LYMPHOCYTES # BLD AUTO: 10.4 % — LOW (ref 13–44)
MAGNESIUM SERPL-MCNC: 2.1 MG/DL — SIGNIFICANT CHANGE UP (ref 1.6–2.6)
MAGNESIUM SERPL-MCNC: 2.2 MG/DL — SIGNIFICANT CHANGE UP (ref 1.6–2.6)
MCHC RBC-ENTMCNC: 33.6 PG — SIGNIFICANT CHANGE UP (ref 27–34)
MCHC RBC-ENTMCNC: 37 GM/DL — HIGH (ref 32–36)
MCV RBC AUTO: 90.6 FL — SIGNIFICANT CHANGE UP (ref 80–100)
MONOCYTES # BLD AUTO: 0.48 K/UL — SIGNIFICANT CHANGE UP (ref 0–0.9)
MONOCYTES NFR BLD AUTO: 6.8 % — SIGNIFICANT CHANGE UP (ref 2–14)
NEUTROPHILS # BLD AUTO: 5.73 K/UL — SIGNIFICANT CHANGE UP (ref 1.8–7.4)
NEUTROPHILS NFR BLD AUTO: 81.7 % — HIGH (ref 43–77)
NITRITE UR-MCNC: NEGATIVE — SIGNIFICANT CHANGE UP
NRBC # BLD: 0 /100 WBCS — SIGNIFICANT CHANGE UP (ref 0–0)
OSMOLALITY UR: 727 MOSM/KG — SIGNIFICANT CHANGE UP (ref 300–900)
PH UR: 6 — SIGNIFICANT CHANGE UP (ref 5–8)
PHOSPHATE SERPL-MCNC: 2.7 MG/DL — SIGNIFICANT CHANGE UP (ref 2.5–4.5)
PHOSPHATE SERPL-MCNC: 3.2 MG/DL — SIGNIFICANT CHANGE UP (ref 2.5–4.5)
PLATELET # BLD AUTO: 232 K/UL — SIGNIFICANT CHANGE UP (ref 150–400)
POTASSIUM SERPL-MCNC: 3.8 MMOL/L — SIGNIFICANT CHANGE UP (ref 3.5–5.3)
POTASSIUM SERPL-MCNC: 3.9 MMOL/L — SIGNIFICANT CHANGE UP (ref 3.5–5.3)
POTASSIUM SERPL-MCNC: 3.9 MMOL/L — SIGNIFICANT CHANGE UP (ref 3.5–5.3)
POTASSIUM SERPL-SCNC: 3.8 MMOL/L — SIGNIFICANT CHANGE UP (ref 3.5–5.3)
POTASSIUM SERPL-SCNC: 3.9 MMOL/L — SIGNIFICANT CHANGE UP (ref 3.5–5.3)
POTASSIUM SERPL-SCNC: 3.9 MMOL/L — SIGNIFICANT CHANGE UP (ref 3.5–5.3)
PROT SERPL-MCNC: 6.8 G/DL — SIGNIFICANT CHANGE UP (ref 6–8.3)
PROT SERPL-MCNC: 6.9 G/DL — SIGNIFICANT CHANGE UP (ref 6–8.3)
PROT UR-MCNC: NEGATIVE MG/DL — SIGNIFICANT CHANGE UP
RBC # BLD: 4.17 M/UL — LOW (ref 4.2–5.8)
RBC # FLD: 13 % — SIGNIFICANT CHANGE UP (ref 10.3–14.5)
RBC CASTS # UR COMP ASSIST: 0 /HPF — SIGNIFICANT CHANGE UP (ref 0–4)
SODIUM SERPL-SCNC: 132 MMOL/L — LOW (ref 135–145)
SODIUM SERPL-SCNC: 137 MMOL/L — SIGNIFICANT CHANGE UP (ref 135–145)
SODIUM SERPL-SCNC: 137 MMOL/L — SIGNIFICANT CHANGE UP (ref 135–145)
SODIUM UR-SCNC: 143 MMOL/L — SIGNIFICANT CHANGE UP
SP GR SPEC: 1 — SIGNIFICANT CHANGE UP (ref 1–1.03)
SQUAMOUS # UR AUTO: 0 /HPF — SIGNIFICANT CHANGE UP (ref 0–5)
UROBILINOGEN FLD QL: 0.2 MG/DL — SIGNIFICANT CHANGE UP (ref 0.2–1)
WBC # BLD: 7.02 K/UL — SIGNIFICANT CHANGE UP (ref 3.8–10.5)
WBC # FLD AUTO: 7.02 K/UL — SIGNIFICANT CHANGE UP (ref 3.8–10.5)
WBC UR QL: 1 /HPF — SIGNIFICANT CHANGE UP (ref 0–5)

## 2024-08-18 PROCEDURE — 76705 ECHO EXAM OF ABDOMEN: CPT | Mod: 26

## 2024-08-18 PROCEDURE — 99231 SBSQ HOSP IP/OBS SF/LOW 25: CPT

## 2024-08-18 PROCEDURE — 99233 SBSQ HOSP IP/OBS HIGH 50: CPT

## 2024-08-18 RX ORDER — DESMOPRESSIN ACETATE 4 UG/ML
1 INJECTION, SOLUTION INTRAVENOUS; SUBCUTANEOUS ONCE
Refills: 0 | Status: COMPLETED | OUTPATIENT
Start: 2024-08-18 | End: 2024-08-18

## 2024-08-18 RX ORDER — AMOXICILLIN AND CLAVULANATE POTASSIUM 250; 125 MG/1; MG/1
1 TABLET, FILM COATED ORAL EVERY 12 HOURS
Refills: 0 | Status: DISCONTINUED | OUTPATIENT
Start: 2024-08-18 | End: 2024-08-19

## 2024-08-18 RX ADMIN — DESMOPRESSIN ACETATE 1 MICROGRAM(S): 4 INJECTION, SOLUTION INTRAVENOUS; SUBCUTANEOUS at 11:40

## 2024-08-18 RX ADMIN — ENOXAPARIN SODIUM 40 MILLIGRAM(S): 100 INJECTION SUBCUTANEOUS at 06:00

## 2024-08-18 RX ADMIN — AMOXICILLIN AND CLAVULANATE POTASSIUM 1 TABLET(S): 250; 125 TABLET, FILM COATED ORAL at 17:02

## 2024-08-18 RX ADMIN — CHLORHEXIDINE GLUCONATE 1 APPLICATION(S): 40 SOLUTION TOPICAL at 07:09

## 2024-08-18 RX ADMIN — Medication 250 MILLILITER(S): at 11:40

## 2024-08-18 NOTE — PROGRESS NOTE ADULT - SUBJECTIVE AND OBJECTIVE BOX
NEPHROLOGY PROGRESS NOTE:    Interval history:  No overnight events. Patient seen and examined at bedside. No active complaints    Vitals:  T(F): 98.4 (24 @ 14:28), Max: 99.2 (24 @ 01:01)  HR: 85 (24 @ 17:05)  BP: 147/94 (24 @ 17:05)  RR: 18 (24 @ 17:05)  SpO2: 98% (24 @ 17:05)  Wt(kg): --     @ 07:01  -   @ 07:00  --------------------------------------------------------  IN: 2839.3 mL / OUT: 520 mL / NET: 2319.3 mL     @ 07:01  -   @ 07:00  --------------------------------------------------------  IN: 1399.8 mL / OUT: 3715 mL / NET: -2315.2 mL     @ 07:01  -   @ 17:14  --------------------------------------------------------  IN: 1240 mL / OUT: 680 mL / NET: 560 mL      PE:  General: Not in acute distress, well-nourished  Neck: No visible mas, No JVD noted  Chest: CTAP b/l, no use of accessory respiratory muscles  Heart: RRR, S1/S2 wnl, no MRG  Abdomen: Soft, nontender, nondistended,  no hepatosplenomegaly  Extremities: No clubbing, cyanosis or edema  Neuro:  Alert, no apparent focal deficits, answer questions appropriately    Pertinent labs & Imagin-18    137  |  102  |  x   ----------------------------<  x   3.9   |  x   |  x     Ca    8.8      18 Aug 2024 05:30  Phos  2.7       Mg     2.1         TPro  6.8  /  Alb  3.8  /  TBili  1.3<H>  /  DBili      /  AST  929<H>  /  ALT  121<H>  /  AlkPhos  62                            14.0   7.02  )-----------( 232      ( 18 Aug 2024 05:30 )             37.8       Urine Studies:  Creatinine Trend: 0.69<--, 0.66<--, 0.70<--, 0.70<--, 0.65<--, 0.67<--  Urinalysis Basic - ( 18 Aug 2024 05:30 )    Color:  / Appearance:  / SG:  / pH:   Gluc: 83 mg/dL / Ketone:   / Bili:  / Urobili:    Blood:  / Protein:  / Nitrite:    Leuk Esterase:  / RBC:  / WBC    Sq Epi:  / Non Sq Epi:  / Bacteria:       Osmolality, Random Urine: 727 mosm/kg ( @ 16:09)  Sodium, Random Urine: 143 mmol/L ( @ 16:09)  Sodium, Random Urine: 43 mmol/L ( @ 15:36)  Osmolality, Random Urine: 759 mosm/kg ( @ 15:36)  Creatinine, Random Urine: 370 mg/dL ( 15:36)  Osmolality, Random Urine: 680 mosm/kg ( @ 09:46)  Sodium, Random Urine: 26 mmol/L ( @ 09:46)  Creatinine, Random Urine: 345 mg/dL ( @ 09:46)  Sodium, Random Urine: 50 mmol/L ( @ 01:07)  Osmolality, Random Urine: 759 mosm/kg ( @ 01:07)  Creatinine, Random Urine: 272 mg/dL ( @ 01:07)  Sodium, Random Urine: <20 mmol/L (08-15 @ 16:45)  Creatinine, Random Urine: 5 mg/dL (08-15 @ 16:45)  Protein/Creatinine Ratio Calculation: <0.8 Ratio (08-15 @ 16:45)  Osmolality, Random Urine: 60 mosm/kg (08-15 @ 16:45)  Potassium, Random Urine: 4 mmol/L (08-15 @ 16:45)      MEDICATIONS  (STANDING):  amoxicillin  875 milliGRAM(s)/clavulanate 1 Tablet(s) Oral every 12 hours  chlorhexidine 2% Cloths 1 Application(s) Topical <User Schedule>  dextrose 5%. 1000 milliLiter(s) (250 mL/Hr) IV Continuous <Continuous>  enoxaparin Injectable 40 milliGRAM(s) SubCutaneous every 24 hours    MEDICATIONS  (PRN):

## 2024-08-18 NOTE — PROGRESS NOTE ADULT - ASSESSMENT
45 y/o M w no PMH transferred from [facility] with AMS and seizure like activity. Took 40mg of THC and 1000mg CBD last night plus a glass of wine and wake up this morning feeling dizzy, then started drinking water, as per pt's wife: "he drank water for 2 and a half hours straight and started vomiting and having abnormal behaviour, he was slightly out of it, and unsteady on his feet". Wife states that he does not have a seizure hx, and this has never happened before. When EMS got to the scene pt was combative. Corrected Na upon arrival this am was 117meq, now pte in ICU with Na 124, corrected 7 meq in 5h period, Nephrology consulted.     1- Symptomatic severe hyponatremia: Low risk of ODS, now with rapid rate of correction SNa 124 > 137  Administer DDAVP 2mcg IV push every 8 hour, 1L bolus of D5W  Repeat SNa, Uosm, Regine every 6h   Strict I/O monitoring    2-Rhabdomyolysis - Shi score 4 points: Low risk   Monitor CPK, BMP every 12 hours    Thank you for consulting Nephrology. We will continue to follow the patient closely and provide recommendations as needed.

## 2024-08-18 NOTE — PROGRESS NOTE ADULT - SUBJECTIVE AND OBJECTIVE BOX
INTERVAL HPI/OVERNIGHT EVENTS: 2g mg, 30mmol NaPhos. 500 cc UOP in 2 hours > stat DDAVP 2mcg given. dc agustin. Na 123, ck 80k from 60k. d/w attending 500ccs LR. Na 132. CK84k.         SUBJECTIVE: Patient seen and examined at bedside.     ROS: All negative except as listed above.    VITAL SIGNS:  ICU Vital Signs Last 24 Hrs  T(C): 36.8 (18 Aug 2024 05:01), Max: 37.3 (18 Aug 2024 01:01)  T(F): 98.2 (18 Aug 2024 05:01), Max: 99.2 (18 Aug 2024 01:01)  HR: 80 (18 Aug 2024 05:00) (64 - 85)  BP: 137/72 (18 Aug 2024 05:00) (102/57 - 170/81)  BP(mean): 97 (18 Aug 2024 05:00) (73 - 118)  ABP: --  ABP(mean): --  RR: 12 (18 Aug 2024 05:00) (11 - 38)  SpO2: 96% (18 Aug 2024 05:00) (93% - 100%)    O2 Parameters below as of 18 Aug 2024 05:00  Patient On (Oxygen Delivery Method): room air            Plateau pressure:   P/F ratio:     08-16 @ 07:01  -  08-17 @ 07:00  --------------------------------------------------------  IN: 2839.3 mL / OUT: 520 mL / NET: 2319.3 mL    08-17 @ 07:01  -  08-18 @ 06:44  --------------------------------------------------------  IN: 1399.8 mL / OUT: 3315 mL / NET: -1915.2 mL    I&O's Detail        17 Aug 2024 07:01  -  18 Aug 2024 06:46  --------------------------------------------------------  IN:    IV PiggyBack: 499.8 mL    IV PiggyBack: 400 mL    Lactated Ringers Bolus: 500 mL  Total IN: 1399.8 mL    OUT:    Dexmedetomidine: 0 mL    Indwelling Catheter - Urethral (mL): 1160 mL    Voided (mL): 2155 mL around 500 cc in the past two hours  Total OUT: 3315 mL    Total NET: -1915.2 mL          CAPILLARY BLOOD GLUCOSE    ECG: reviewed.    PHYSICAL EXAM:      MEDICATIONS:  MEDICATIONS  (STANDING):  cefTRIAXone   IVPB 1000 milliGRAM(s) IV Intermittent every 24 hours  enoxaparin Injectable 40 milliGRAM(s) SubCutaneous every 24 hours    MEDICATIONS  (PRN):      ALLERGIES:  Allergies    No Known Allergies    Intolerances        LABS:                        12.9   11.75 )-----------( 157      ( 17 Aug 2024 06:14 )             35.4     08-18    132<L>  |  99  |  2<L>  ----------------------------<  84  3.8   |  21<L>  |  0.66    Ca    8.0<L>      18 Aug 2024 01:30  Phos  2.4     08-17  Mg     1.9     08-17    TPro  6.1  /  Alb  3.6  /  TBili  1.4<H>  /  DBili  x   /  AST  787<H>  /  ALT  94<H>  /  AlkPhos  52  08-17      Urinalysis Basic - ( 18 Aug 2024 01:30 )    Color: x / Appearance: x / SG: x / pH: x  Gluc: 84 mg/dL / Ketone: x  / Bili: x / Urobili: x   Blood: x / Protein: x / Nitrite: x   Leuk Esterase: x / RBC: x / WBC x   Sq Epi: x / Non Sq Epi: x / Bacteria: x      ABG:      vBG:    Micro:    Culture - Blood (collected 08-15-24 @ 11:46)  Source: .Blood Blood  Preliminary Report (08-17-24 @ 18:01):    No growth at 48 Hours    Culture - Blood (collected 08-15-24 @ 11:46)  Source: .Blood Blood  Preliminary Report (08-17-24 @ 18:01):    No growth at 48 Hours          RADIOLOGY & ADDITIONAL TESTS: Reviewed.   INTERVAL HPI/OVERNIGHT EVENTS: 2g mg, 30mmol NaPhos. 500 cc UOP in 2 hours > stat DDAVP 2mcg given. dc azithro. Na 123, ck 80k from 60k. d/w attending 500ccs LR. Na 132. CK84k-> CK 94k on repeat, BUN Cr is wnl and patient making appropriate urine output.     SUBJECTIVE: Patient seen and examined at bedside, mobilizing and walking, diet ordered, is feeling like himself, anxious to get IVs out, no muscle pain or tenderness    ROS: All negative except as listed above.    VITAL SIGNS:  ICU Vital Signs Last 24 Hrs  T(C): 36.8 (18 Aug 2024 05:01), Max: 37.3 (18 Aug 2024 01:01)  T(F): 98.2 (18 Aug 2024 05:01), Max: 99.2 (18 Aug 2024 01:01)  HR: 80 (18 Aug 2024 05:00) (64 - 85)  BP: 137/72 (18 Aug 2024 05:00) (102/57 - 170/81)  BP(mean): 97 (18 Aug 2024 05:00) (73 - 118)  ABP: --  ABP(mean): --  RR: 12 (18 Aug 2024 05:00) (11 - 38)  SpO2: 96% (18 Aug 2024 05:00) (93% - 100%)    O2 Parameters below as of 18 Aug 2024 05:00  Patient On (Oxygen Delivery Method): room air      Plateau pressure:   P/F ratio:     08-16 @ 07:01  -  08-17 @ 07:00  --------------------------------------------------------  IN: 2839.3 mL / OUT: 520 mL / NET: 2319.3 mL    08-17 @ 07:01  -  08-18 @ 06:44  --------------------------------------------------------  IN: 1399.8 mL / OUT: 3315 mL / NET: -1915.2 mL    I&O's Detail        17 Aug 2024 07:01  -  18 Aug 2024 06:46  --------------------------------------------------------  IN:    IV PiggyBack: 499.8 mL    IV PiggyBack: 400 mL    Lactated Ringers Bolus: 500 mL  Total IN: 1399.8 mL    OUT:    Dexmedetomidine: 0 mL    Indwelling Catheter - Urethral (mL): 1160 mL    Voided (mL): 2155 mL around 500 cc in the past two hours  Total OUT: 3315 mL    Total NET: -1915.2 mL          CAPILLARY BLOOD GLUCOSE    ECG: reviewed.    PHYSICAL EXAM: patient AOx4, walking, all exam wnl.  T(C): 36.4 (08-18-24 @ 09:10), Max: 37.3 (08-18-24 @ 01:01)  HR: 92 (08-18-24 @ 09:00) (73 - 92)  BP: 149/88 (08-18-24 @ 09:00) (106/63 - 170/81)  RR: 20 (08-18-24 @ 09:00) (11 - 38)  SpO2: 99% (08-18-24 @ 09:00) (93% - 99%)    CONSTITUTIONAL: Well groomed, no apparent distress  EYES: PERRLA and symmetric, EOMI, No conjunctival or scleral injection, non-icteric  ENMT: Oral mucosa with moist membranes. Normal dentition; no pharyngeal injection or exudates             NECK: Supple, symmetric and without tracheal deviation   RESP: No respiratory distress, no use of accessory muscles; CTA b/l, no WRR  CV: RRR, +S1S2, no MRG; no JVD; no peripheral edema  GI: Soft, NT, ND, no rebound, no guarding; no palpable masses; no hepatosplenomegaly; no hernia palpated  LYMPH: No cervical LAD or tenderness; no axillary LAD or tenderness; no inguinal LAD or tenderness  MSK: Normal gait; No digital clubbing or cyanosis; examination of the (head/neck/spine/ribs/pelvis, RUE, LUE, RLE, LLE) without misalignment,            Normal ROM without pain, no spinal tenderness, normal muscle strength/tone  SKIN: No rashes or ulcers noted; no subcutaneous nodules or induration palpable  NEURO: CN II-XII intact; normal reflexes in upper and lower extremities, sensation intact in upper and lower extremities b/l to light touch   PSYCH: Appropriate insight/judgment; A+O x 3, mood and affect appropriate, recent/remote memory intact      MEDICATIONS:  MEDICATIONS  (STANDING):  cefTRIAXone   IVPB 1000 milliGRAM(s) IV Intermittent every 24 hours  enoxaparin Injectable 40 milliGRAM(s) SubCutaneous every 24 hours    MEDICATIONS  (PRN):      ALLERGIES:  Allergies    No Known Allergies    Intolerances        LABS:                        12.9   11.75 )-----------( 157      ( 17 Aug 2024 06:14 )             35.4     08-18    132<L>  |  99  |  2<L>  ----------------------------<  84  3.8   |  21<L>  |  0.66    Ca    8.0<L>      18 Aug 2024 01:30  Phos  2.4     08-17  Mg     1.9     08-17    TPro  6.1  /  Alb  3.6  /  TBili  1.4<H>  /  DBili  x   /  AST  787<H>  /  ALT  94<H>  /  AlkPhos  52  08-17      Urinalysis Basic - ( 18 Aug 2024 01:30 )    Color: x / Appearance: x / SG: x / pH: x  Gluc: 84 mg/dL / Ketone: x  / Bili: x / Urobili: x   Blood: x / Protein: x / Nitrite: x   Leuk Esterase: x / RBC: x / WBC x   Sq Epi: x / Non Sq Epi: x / Bacteria: x      ABG:      vBG:    Micro:    Culture - Blood (collected 08-15-24 @ 11:46)  Source: .Blood Blood  Preliminary Report (08-17-24 @ 18:01):    No growth at 48 Hours    Culture - Blood (collected 08-15-24 @ 11:46)  Source: .Blood Blood  Preliminary Report (08-17-24 @ 18:01):    No growth at 48 Hours          RADIOLOGY & ADDITIONAL TESTS: Reviewed.

## 2024-08-18 NOTE — PROGRESS NOTE ADULT - ASSESSMENT
Patient is 44 YOM with no significant PMH who presents from [facility] w seizures likely 2/2 symptomatic hyponatremia. Patient neurologic status much improved this AM, AOx3, sodium remains within goal, continuing to improve.     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  Much improved on 8/17 to 126, goal Na by 8/18 11AM is 134 - at current its 132  Plan  - Q4H BMP  - Q4H urine studies w/ BMP    #I&O  Castillo in place  Exchange for condom catheter  - monitor w strict I&Os    GI  ERICK      ENDO  ERICK      ID:  #Like aspiration PNA vs pneumonitis    - c/w CTX 1g Q24 as above  - continue to monitor fever curve and WBC      HEME/ONC  ERICK        SKIN:  #Lines: PIV      PREVENTIVE   F: D5W as needed to prevent overcorrection  E: K>4, Mg>2  N: NPO  GI: none  DVT: lovenox 40mg  DISPO: MICU   Patient is 44 YOM with no significant PMH who presents from [facility] w seizures likely 2/2 symptomatic hyponatremia. Patient neurologic status much improved this AM, AOx3, sodium remains within goal, continuing to improve.     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

## 2024-08-19 VITALS
DIASTOLIC BLOOD PRESSURE: 75 MMHG | OXYGEN SATURATION: 95 % | SYSTOLIC BLOOD PRESSURE: 110 MMHG | TEMPERATURE: 98 F | RESPIRATION RATE: 18 BRPM | HEART RATE: 85 BPM

## 2024-08-19 DIAGNOSIS — Z29.9 ENCOUNTER FOR PROPHYLACTIC MEASURES, UNSPECIFIED: ICD-10-CM

## 2024-08-19 DIAGNOSIS — R74.8 ABNORMAL LEVELS OF OTHER SERUM ENZYMES: ICD-10-CM

## 2024-08-19 DIAGNOSIS — E87.1 HYPO-OSMOLALITY AND HYPONATREMIA: ICD-10-CM

## 2024-08-19 DIAGNOSIS — M62.82 RHABDOMYOLYSIS: ICD-10-CM

## 2024-08-19 DIAGNOSIS — J69.0 PNEUMONITIS DUE TO INHALATION OF FOOD AND VOMIT: ICD-10-CM

## 2024-08-19 LAB
ADD ON TEST-SPECIMEN IN LAB: SIGNIFICANT CHANGE UP
ADD ON TEST-SPECIMEN IN LAB: SIGNIFICANT CHANGE UP
ALBUMIN SERPL ELPH-MCNC: 3.9 G/DL — SIGNIFICANT CHANGE UP (ref 3.3–5)
ALP SERPL-CCNC: 55 U/L — SIGNIFICANT CHANGE UP (ref 40–120)
ALT FLD-CCNC: 147 U/L — HIGH (ref 10–45)
ANION GAP SERPL CALC-SCNC: 10 MMOL/L — SIGNIFICANT CHANGE UP (ref 5–17)
ANION GAP SERPL CALC-SCNC: 12 MMOL/L — SIGNIFICANT CHANGE UP (ref 5–17)
ANION GAP SERPL CALC-SCNC: 8 MMOL/L — SIGNIFICANT CHANGE UP (ref 5–17)
AST SERPL-CCNC: 846 U/L — HIGH (ref 10–40)
BASOPHILS # BLD AUTO: 0.04 K/UL — SIGNIFICANT CHANGE UP (ref 0–0.2)
BASOPHILS NFR BLD AUTO: 0.6 % — SIGNIFICANT CHANGE UP (ref 0–2)
BILIRUB SERPL-MCNC: 1.1 MG/DL — SIGNIFICANT CHANGE UP (ref 0.2–1.2)
BUN SERPL-MCNC: 4 MG/DL — LOW (ref 7–23)
BUN SERPL-MCNC: 6 MG/DL — LOW (ref 7–23)
BUN SERPL-MCNC: 6 MG/DL — LOW (ref 7–23)
CALCIUM SERPL-MCNC: 8.9 MG/DL — SIGNIFICANT CHANGE UP (ref 8.4–10.5)
CALCIUM SERPL-MCNC: 8.9 MG/DL — SIGNIFICANT CHANGE UP (ref 8.4–10.5)
CALCIUM SERPL-MCNC: 9.2 MG/DL — SIGNIFICANT CHANGE UP (ref 8.4–10.5)
CHLORIDE SERPL-SCNC: 101 MMOL/L — SIGNIFICANT CHANGE UP (ref 96–108)
CHLORIDE SERPL-SCNC: 101 MMOL/L — SIGNIFICANT CHANGE UP (ref 96–108)
CHLORIDE SERPL-SCNC: 99 MMOL/L — SIGNIFICANT CHANGE UP (ref 96–108)
CK SERPL-CCNC: CRITICAL HIGH U/L (ref 30–200)
CO2 SERPL-SCNC: 25 MMOL/L — SIGNIFICANT CHANGE UP (ref 22–31)
CO2 SERPL-SCNC: 25 MMOL/L — SIGNIFICANT CHANGE UP (ref 22–31)
CO2 SERPL-SCNC: 27 MMOL/L — SIGNIFICANT CHANGE UP (ref 22–31)
CREAT SERPL-MCNC: 0.65 MG/DL — SIGNIFICANT CHANGE UP (ref 0.5–1.3)
CREAT SERPL-MCNC: 0.74 MG/DL — SIGNIFICANT CHANGE UP (ref 0.5–1.3)
CREAT SERPL-MCNC: 0.75 MG/DL — SIGNIFICANT CHANGE UP (ref 0.5–1.3)
EGFR: 113 ML/MIN/1.73M2 — SIGNIFICANT CHANGE UP
EGFR: 114 ML/MIN/1.73M2 — SIGNIFICANT CHANGE UP
EGFR: 118 ML/MIN/1.73M2 — SIGNIFICANT CHANGE UP
EOSINOPHIL # BLD AUTO: 0.15 K/UL — SIGNIFICANT CHANGE UP (ref 0–0.5)
EOSINOPHIL NFR BLD AUTO: 2.4 % — SIGNIFICANT CHANGE UP (ref 0–6)
GLUCOSE BLDC GLUCOMTR-MCNC: 144 MG/DL — HIGH (ref 70–99)
GLUCOSE SERPL-MCNC: 103 MG/DL — HIGH (ref 70–99)
GLUCOSE SERPL-MCNC: 112 MG/DL — HIGH (ref 70–99)
GLUCOSE SERPL-MCNC: 172 MG/DL — HIGH (ref 70–99)
HCT VFR BLD CALC: 35.8 % — LOW (ref 39–50)
HGB BLD-MCNC: 12.9 G/DL — LOW (ref 13–17)
IMM GRANULOCYTES NFR BLD AUTO: 0.2 % — SIGNIFICANT CHANGE UP (ref 0–0.9)
LYMPHOCYTES # BLD AUTO: 0.73 K/UL — LOW (ref 1–3.3)
LYMPHOCYTES # BLD AUTO: 11.8 % — LOW (ref 13–44)
MAGNESIUM SERPL-MCNC: 1.8 MG/DL — SIGNIFICANT CHANGE UP (ref 1.6–2.6)
MCHC RBC-ENTMCNC: 32.3 PG — SIGNIFICANT CHANGE UP (ref 27–34)
MCHC RBC-ENTMCNC: 36 GM/DL — SIGNIFICANT CHANGE UP (ref 32–36)
MCV RBC AUTO: 89.7 FL — SIGNIFICANT CHANGE UP (ref 80–100)
MONOCYTES # BLD AUTO: 0.48 K/UL — SIGNIFICANT CHANGE UP (ref 0–0.9)
MONOCYTES NFR BLD AUTO: 7.7 % — SIGNIFICANT CHANGE UP (ref 2–14)
NEUTROPHILS # BLD AUTO: 4.79 K/UL — SIGNIFICANT CHANGE UP (ref 1.8–7.4)
NEUTROPHILS NFR BLD AUTO: 77.3 % — HIGH (ref 43–77)
NRBC # BLD: 0 /100 WBCS — SIGNIFICANT CHANGE UP (ref 0–0)
PHOSPHATE SERPL-MCNC: 2.9 MG/DL — SIGNIFICANT CHANGE UP (ref 2.5–4.5)
PLATELET # BLD AUTO: 272 K/UL — SIGNIFICANT CHANGE UP (ref 150–400)
POTASSIUM SERPL-MCNC: 3.6 MMOL/L — SIGNIFICANT CHANGE UP (ref 3.5–5.3)
POTASSIUM SERPL-MCNC: 3.9 MMOL/L — SIGNIFICANT CHANGE UP (ref 3.5–5.3)
POTASSIUM SERPL-MCNC: 4.3 MMOL/L — SIGNIFICANT CHANGE UP (ref 3.5–5.3)
POTASSIUM SERPL-SCNC: 3.6 MMOL/L — SIGNIFICANT CHANGE UP (ref 3.5–5.3)
POTASSIUM SERPL-SCNC: 3.9 MMOL/L — SIGNIFICANT CHANGE UP (ref 3.5–5.3)
POTASSIUM SERPL-SCNC: 4.3 MMOL/L — SIGNIFICANT CHANGE UP (ref 3.5–5.3)
PROT SERPL-MCNC: 6.6 G/DL — SIGNIFICANT CHANGE UP (ref 6–8.3)
RBC # BLD: 3.99 M/UL — LOW (ref 4.2–5.8)
RBC # FLD: 12.9 % — SIGNIFICANT CHANGE UP (ref 10.3–14.5)
SODIUM SERPL-SCNC: 134 MMOL/L — LOW (ref 135–145)
SODIUM SERPL-SCNC: 136 MMOL/L — SIGNIFICANT CHANGE UP (ref 135–145)
SODIUM SERPL-SCNC: 138 MMOL/L — SIGNIFICANT CHANGE UP (ref 135–145)
WBC # BLD: 6.2 K/UL — SIGNIFICANT CHANGE UP (ref 3.8–10.5)
WBC # FLD AUTO: 6.2 K/UL — SIGNIFICANT CHANGE UP (ref 3.8–10.5)

## 2024-08-19 PROCEDURE — 80048 BASIC METABOLIC PNL TOTAL CA: CPT

## 2024-08-19 PROCEDURE — 95700 EEG CONT REC W/VID EEG TECH: CPT

## 2024-08-19 PROCEDURE — 80053 COMPREHEN METABOLIC PANEL: CPT

## 2024-08-19 PROCEDURE — 99231 SBSQ HOSP IP/OBS SF/LOW 25: CPT

## 2024-08-19 PROCEDURE — 87340 HEPATITIS B SURFACE AG IA: CPT

## 2024-08-19 PROCEDURE — 76705 ECHO EXAM OF ABDOMEN: CPT

## 2024-08-19 PROCEDURE — 80307 DRUG TEST PRSMV CHEM ANLYZR: CPT

## 2024-08-19 PROCEDURE — 82570 ASSAY OF URINE CREATININE: CPT

## 2024-08-19 PROCEDURE — 84540 ASSAY OF URINE/UREA-N: CPT

## 2024-08-19 PROCEDURE — 70450 CT HEAD/BRAIN W/O DYE: CPT | Mod: MC

## 2024-08-19 PROCEDURE — 86709 HEPATITIS A IGM ANTIBODY: CPT

## 2024-08-19 PROCEDURE — 71045 X-RAY EXAM CHEST 1 VIEW: CPT

## 2024-08-19 PROCEDURE — 83605 ASSAY OF LACTIC ACID: CPT

## 2024-08-19 PROCEDURE — 85610 PROTHROMBIN TIME: CPT

## 2024-08-19 PROCEDURE — 85730 THROMBOPLASTIN TIME PARTIAL: CPT

## 2024-08-19 PROCEDURE — 93005 ELECTROCARDIOGRAM TRACING: CPT

## 2024-08-19 PROCEDURE — 84156 ASSAY OF PROTEIN URINE: CPT

## 2024-08-19 PROCEDURE — 82962 GLUCOSE BLOOD TEST: CPT

## 2024-08-19 PROCEDURE — 74177 CT ABD & PELVIS W/CONTRAST: CPT | Mod: MC

## 2024-08-19 PROCEDURE — 86705 HEP B CORE ANTIBODY IGM: CPT

## 2024-08-19 PROCEDURE — 86900 BLOOD TYPING SEROLOGIC ABO: CPT

## 2024-08-19 PROCEDURE — 84146 ASSAY OF PROLACTIN: CPT

## 2024-08-19 PROCEDURE — 95708 EEG WO VID EA 12-26HR UNMNTR: CPT

## 2024-08-19 PROCEDURE — 99285 EMERGENCY DEPT VISIT HI MDM: CPT

## 2024-08-19 PROCEDURE — 82550 ASSAY OF CK (CPK): CPT

## 2024-08-19 PROCEDURE — 84484 ASSAY OF TROPONIN QUANT: CPT

## 2024-08-19 PROCEDURE — 86850 RBC ANTIBODY SCREEN: CPT

## 2024-08-19 PROCEDURE — 86706 HEP B SURFACE ANTIBODY: CPT

## 2024-08-19 PROCEDURE — 83935 ASSAY OF URINE OSMOLALITY: CPT

## 2024-08-19 PROCEDURE — 87449 NOS EACH ORGANISM AG IA: CPT

## 2024-08-19 PROCEDURE — 83930 ASSAY OF BLOOD OSMOLALITY: CPT

## 2024-08-19 PROCEDURE — 36415 COLL VENOUS BLD VENIPUNCTURE: CPT

## 2024-08-19 PROCEDURE — 81001 URINALYSIS AUTO W/SCOPE: CPT

## 2024-08-19 PROCEDURE — 84436 ASSAY OF TOTAL THYROXINE: CPT

## 2024-08-19 PROCEDURE — 99239 HOSP IP/OBS DSCHRG MGMT >30: CPT | Mod: GC

## 2024-08-19 PROCEDURE — 86803 HEPATITIS C AB TEST: CPT

## 2024-08-19 PROCEDURE — 84443 ASSAY THYROID STIM HORMONE: CPT

## 2024-08-19 PROCEDURE — 85025 COMPLETE CBC W/AUTO DIFF WBC: CPT

## 2024-08-19 PROCEDURE — 87040 BLOOD CULTURE FOR BACTERIA: CPT

## 2024-08-19 PROCEDURE — 84100 ASSAY OF PHOSPHORUS: CPT

## 2024-08-19 PROCEDURE — 84295 ASSAY OF SERUM SODIUM: CPT

## 2024-08-19 PROCEDURE — 83735 ASSAY OF MAGNESIUM: CPT

## 2024-08-19 PROCEDURE — 84300 ASSAY OF URINE SODIUM: CPT

## 2024-08-19 PROCEDURE — 86901 BLOOD TYPING SEROLOGIC RH(D): CPT

## 2024-08-19 PROCEDURE — 84133 ASSAY OF URINE POTASSIUM: CPT

## 2024-08-19 RX ORDER — DESMOPRESSIN ACETATE 4 UG/ML
2 INJECTION, SOLUTION INTRAVENOUS; SUBCUTANEOUS ONCE
Refills: 0 | Status: COMPLETED | OUTPATIENT
Start: 2024-08-19 | End: 2024-08-19

## 2024-08-19 RX ORDER — AMOXICILLIN AND CLAVULANATE POTASSIUM 250; 125 MG/1; MG/1
1 TABLET, FILM COATED ORAL
Qty: 6 | Refills: 0
Start: 2024-08-19 | End: 2024-08-21

## 2024-08-19 RX ORDER — POTASSIUM CHLORIDE 10 MEQ
40 TABLET, EXT RELEASE, PARTICLES/CRYSTALS ORAL ONCE
Refills: 0 | Status: COMPLETED | OUTPATIENT
Start: 2024-08-19 | End: 2024-08-19

## 2024-08-19 RX ADMIN — Medication 250 MILLILITER(S): at 02:49

## 2024-08-19 RX ADMIN — AMOXICILLIN AND CLAVULANATE POTASSIUM 1 TABLET(S): 250; 125 TABLET, FILM COATED ORAL at 17:45

## 2024-08-19 RX ADMIN — ENOXAPARIN SODIUM 40 MILLIGRAM(S): 100 INJECTION SUBCUTANEOUS at 05:52

## 2024-08-19 RX ADMIN — CHLORHEXIDINE GLUCONATE 1 APPLICATION(S): 40 SOLUTION TOPICAL at 05:53

## 2024-08-19 RX ADMIN — Medication 40 MILLIEQUIVALENT(S): at 13:41

## 2024-08-19 RX ADMIN — AMOXICILLIN AND CLAVULANATE POTASSIUM 1 TABLET(S): 250; 125 TABLET, FILM COATED ORAL at 05:52

## 2024-08-19 RX ADMIN — DESMOPRESSIN ACETATE 2 MICROGRAM(S): 4 INJECTION, SOLUTION INTRAVENOUS; SUBCUTANEOUS at 05:39

## 2024-08-19 NOTE — PROGRESS NOTE ADULT - SUBJECTIVE AND OBJECTIVE BOX
Nephrology progress note    Subjective  Pt seen at bedside, no longer confused, awake and interactive    VITALS:  T(F): 97.6 (08-19-24 @ 12:00), Max: 99.5 (08-18-24 @ 17:33)  HR: 85 (08-19-24 @ 12:00)  BP: 110/75 (08-19-24 @ 12:00)  RR: 18 (08-19-24 @ 12:00)  SpO2: 95% (08-19-24 @ 12:00)  Wt(kg): --    08-17 @ 07:01  -  08-18 @ 07:00  --------------------------------------------------------  IN: 1399.8 mL / OUT: 3715 mL / NET: -2315.2 mL    08-18 @ 07:01  -  08-19 @ 07:00  --------------------------------------------------------  IN: 1240 mL / OUT: 770 mL / NET: 470 mL    08-19 @ 07:01  -  08-19 @ 15:20  --------------------------------------------------------  IN: 0 mL / OUT: 175 mL / NET: -175 mL        PHYSICAL EXAM:    General: Not in acute distress, well-nourished  Chest: CTAP b/l, no use of accessory respiratory muscles  Heart: RRR, S1/S2 wnl, no MRG  Abdomen: Soft, nontender, nondistended,  no hepatosplenomegaly  Extremities: No clubbing, cyanosis or edema  Neuro:  Alert, no apparent focal deficits, answer questions appropriately  LABS:  08-19    134<L>  |  99  |  6<L>  ----------------------------<  112<H>  4.3   |  27  |  0.74    Ca    9.2      19 Aug 2024 12:25  Phos  2.9     08-19  Mg     1.8     08-19    TPro  6.6  /  Alb  3.9  /  TBili  1.1  /  DBili      /  AST  846<H>  /  ALT  147<H>  /  AlkPhos  55  08-19                          12.9   6.20  )-----------( 272      ( 19 Aug 2024 06:54 )             35.8       Urine Studies:  Creatinine Trend: 0.74<--, 0.65<--, 0.75<--, 0.72<--, 0.69<--, 0.66<--  Urinalysis Basic - ( 19 Aug 2024 12:25 )    Color:  / Appearance:  / SG:  / pH:   Gluc: 112 mg/dL / Ketone:   / Bili:  / Urobili:    Blood:  / Protein:  / Nitrite:    Leuk Esterase:  / RBC:  / WBC    Sq Epi:  / Non Sq Epi:  / Bacteria:       Osmolality, Random Urine: 727 mosm/kg (08-18 @ 16:09)  Sodium, Random Urine: 143 mmol/L (08-18 @ 16:09)  Sodium, Random Urine: 43 mmol/L (08-16 @ 15:36)  Osmolality, Random Urine: 759 mosm/kg (08-16 @ 15:36)  Creatinine, Random Urine: 370 mg/dL (08-16 @ 15:36)  Osmolality, Random Urine: 680 mosm/kg (08-16 @ 09:46)  Sodium, Random Urine: 26 mmol/L (08-16 @ 09:46)  Creatinine, Random Urine: 345 mg/dL (08-16 @ 09:46)  Sodium, Random Urine: 50 mmol/L (08-16 @ 01:07)  Osmolality, Random Urine: 759 mosm/kg (08-16 @ 01:07)  Creatinine, Random Urine: 272 mg/dL (08-16 @ 01:07)  Sodium, Random Urine: <20 mmol/L (08-15 @ 16:45)  Creatinine, Random Urine: 5 mg/dL (08-15 @ 16:45)  Protein/Creatinine Ratio Calculation: <0.8 Ratio (08-15 @ 16:45)  Osmolality, Random Urine: 60 mosm/kg (08-15 @ 16:45)  Potassium, Random Urine: 4 mmol/L (08-15 @ 16:45)    RADIOLOGY & ADDITIONAL STUDIES:

## 2024-08-19 NOTE — PHARMACY COMMUNICATION NOTE - COMMENTS
Performed med rec at the patient's bedside.  Patient is from [facility], and does have a local pharmacy, but agreed to  his antibiotics in [facility] at [facility].  Patient confirmed that he was not on any medication prior to admission.

## 2024-08-19 NOTE — PROGRESS NOTE ADULT - PROBLEM SELECTOR PLAN 5
F: D5W 1 L @  E: K>4, Mg>2  N: Full Diet  GI: none  DVT: lovenox 40mg  DISPO: MICU F: D5W 1 L @  E: K>4, Mg>2  N: Full Diet  GI: none  DVT: lovenox 40mg  DISPO: RMF

## 2024-08-19 NOTE — DISCHARGE NOTE NURSING/CASE MANAGEMENT/SOCIAL WORK - PATIENT PORTAL LINK FT
----- Message from Dayna Herrera MD sent at 6/29/2021  9:02 AM CDT -----  Please inform patient that her pap test result is normal. Thank you.   You can access the FollowMyHealth Patient Portal offered by [facility] by registering at the following website: [email or web address]. By joining [facility]’s FollowMyHealth portal, you will also be able to view your health information using other applications (apps) compatible with our system.

## 2024-08-19 NOTE — PROGRESS NOTE ADULT - PROBLEM SELECTOR PLAN 1
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 as per renal rec, cautiously treating a potential overcorrection:    - DDAVP 2mcg IV push q8   - 1L bolus D5W  - urine osm, urine Na  - Q4H BMP  - diet re-ordered

## 2024-08-19 NOTE — DISCHARGE NOTE PROVIDER - HOSPITAL COURSE
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     HOSPITAL COURSE: 44Y M w no PMH presented to [facility] with AMS and seizure like activity. Took 40mg THC and 1000mg CBD in attempt to sleep. Per ED provider note, EMS states that they responded to the 911 call from his wife. Wife states that he ate 40 mg of THC and 1000 mg of CBD in an attempt to sleep. They are visiting from [place]. Wife states that the next day morning he felt dizzy, so she instructed him to drink water. States that "he drank water for 2 and a half hours straight'. Vomited once, and then had seizure like activity, described as "slightly out of it", wife otherwise noted drooling, shaking, and teeth clenched. Wife denied that he complained of any other symptoms, merely unsteady on his feet and feeling "intoxicated". In the ED patient was given 1L NaCl (Na improved from 116 to 120). He was admitted to MICU and started on 2mcg DDAVP Q6H and D5W boluses. Patient was noted to have leukocytosis and right lower lobe infiltrates mild fever and was started on Ctx/azithromycin for aspiration pneumonia, now transitioned to amoxicillin 875mg BID. Patient is transferred to [facility] for further monitoring of sodium level, pneumonia, rhabdomyolysis, elevated LFTs. Patient was noted to have rising AST/ALT, RUQ ultrasound was performed which was unremarkable. Since arrival to the floors, patient has been stable and febrile.       Changes to medications: None   New Medications: None     Physical Exam on discharge:   General: not in acute distress. 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     HOSPITAL COURSE: 44Y M w no PMH presented to [facility] with AMS and seizure like activity. Took 40mg THC and 1000mg CBD in attempt to sleep. Wife states that he ate 40 mg of THC and 1000 mg of CBD in an attempt to sleep. Per patient report, he felt "intoxicated." States that "he drank water for 2 and a half hours straight'. Vomited once, and then had seizure like activity, described as "slightly out of it", wife otherwise noted drooling, shaking, and teeth clenched. In the ED patient was given 1L NaCl (Na improved from 116 to 120). He was admitted to MICU and started on 2mcg DDAVP Q6H and D5W boluses. Patient was noted to have leukocytosis and right lower lobe infiltrates mild fever and was started on Ceftriaxone and azithromycin for aspiration pneumonia, now transitioned to amoxicillin 875mg BID. Patient is transferred to [facility] for further monitoring of sodium level, pneumonia, rhabdomyolysis, elevated LFTs. Patient was noted to have rising AST/ALT, RUQ ultrasound was performed and hepatitis serologies ordered which was unremarkable. Since arrival to the floors, patient has been stable and febrile.       Changes to medications: None   New Medications: None     Physical Exam on discharge:   General: not in acute distress. 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 Improving hyponatremia, today at 136.   - Appreciate Nephrology recs, treating potential overcorrection:      --> DDAVP 2mcg IV push q8      --> 1L bolus D5W     --> monitored BMP     Problem/Plan - 2: Pneumonia, aspiration.   - Likely due to aspiration, given CXR with RLL infiltrate, and WBC was elevated 16.7. Improving pneumonia given normal WBC of 6.2  Stopped azithromycin (2 doses given Aug 16 and 17)  CTX 1g from 8/16-8/18 (3 doses)  - c/w amox clav 875 mg BID x 4 days for a total of 7d abx (in prep for d/c).     Problem/Plan - 3:  ·  Problem: Rhabdomyolysis.   ·  Plan: CK elevated, 8/18 817273. Shi score 4 points: Low risk   - Monitor CPK, BMP every 12 hours  - Monitor urinary output.     Problem/Plan - 4:  ·  Problem: Elevated liver enzymes.   ·  Plan: ,  (trending up) normal bili and alk phos. Likely related to rhabdomyolysis   - ctm.         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     HOSPITAL COURSE: 44Y M w no PMH presented to [facility] with AMS and seizure like activity. Took 40mg THC and 1000mg CBD in attempt to sleep. Wife states that he ate 40 mg of THC and 1000 mg of CBD in an attempt to sleep. Per patient report, he felt "intoxicated." States that "he drank water for 2 and a half hours straight'. Vomited once, and then had seizure like activity, described as "slightly out of it", wife otherwise noted drooling, shaking, and teeth clenched. In the ED patient was given 1L NaCl (Na improved from 116 to 120). He was admitted to MICU and started on 2mcg DDAVP Q6H and D5W boluses. Patient was noted to have leukocytosis and right lower lobe infiltrates mild fever and was started on Ceftriaxone and azithromycin for aspiration pneumonia, now transitioned to amoxicillin 875mg BID. Patient is transferred to [facility] for further monitoring of sodium level, pneumonia, rhabdomyolysis, elevated LFTs. Patient was noted to have rising AST/ALT, RUQ ultrasound was performed and hepatitis serologies ordered which was unremarkable. Since arrival to the floors, patient has been stable and afebrile.       Changes to medications: None   New Medications: None     Physical Exam on discharge:   General: not in acute distress. 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 Improving hyponatremia, today at 136.   - Appreciate Nephrology recs, treating potential overcorrection:      --> DDAVP 2mcg IV push q8      --> 1L bolus D5W     --> monitored BMP     Problem/Plan - 2: Pneumonia, aspiration.   - Likely due to aspiration, given CXR with RLL infiltrate, and WBC was elevated 16.7. Improving pneumonia given normal WBC of 6.2  Stopped azithromycin (2 doses given Aug 16 and 17)  CTX 1g from 8/16-8/18 (3 doses)  - c/w amox clav 875 mg BID x 4 days for a total of 7d abx (in prep for d/c).     Problem/Plan - 3: Rhabdomyolysis- likely due to severe hyponatremia or seizure.   ·  CK elevated, 8/18 - 141006. Improving, downtrending CK to 61,000   - Monitored CPK, BMP every 12 hours  - Monitored urinary output.     Problem/Plan - 4:  Elevated liver enzymes.   - Likely related to rhabdomyolysis, downtrending AST to 846, ALT uptrending to 147, with normal bili and alk phos.   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     HOSPITAL COURSE: 44Y M w no PMH presented to [facility] with AMS and seizure like activity. Took 40mg THC and 1000mg CBD in attempt to sleep. Wife states that he ate 40 mg of THC and 1000 mg of CBD in an attempt to sleep. Per patient report, he felt "intoxicated." States that "he drank water for 2 and a half hours straight'. Vomited once, and then had seizure like activity, described as "slightly out of it", wife otherwise noted drooling, shaking, and teeth clenched. In the ED patient was given 1L NaCl (Na improved from 116 to 120). He was admitted to MICU and started on 2mcg DDAVP Q6H and D5W boluses. Patient was noted to have leukocytosis and right lower lobe infiltrates mild fever and was started on Ceftriaxone and azithromycin for aspiration pneumonia, now transitioned to amoxicillin 875mg BID. Patient is transferred to [facility] for further monitoring of sodium level, pneumonia, rhabdomyolysis, elevated LFTs. Patient was noted to have rising AST/ALT, RUQ ultrasound was performed and hepatitis serologies ordered which was unremarkable. Since arrival to the floors, patient has been stable and afebrile.       Changes to medications: None   New Medications: None     Physical Exam on discharge:   General: not in acute distress. 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 Improving hyponatremia, today at 136.   - Appreciate Nephrology recs, treating potential overcorrection:      --> DDAVP 2mcg IV push q8      --> 1L bolus D5W     --> monitored BMP     Problem/Plan - 2: Pneumonia, aspiration.   - Likely due to aspiration, given CXR with RLL infiltrate, and WBC was elevated 16.7. Improving pneumonia given normal WBC of 6.2  Stopped azithromycin (2 doses given Aug 16 and 17), CTX 1g from 8/16-8/18 (3 doses)  - c/w amox clav 875 mg BID x 4 days for a total of 7d abx (in prep for d/c).     Problem/Plan - 3: Rhabdomyolysis- likely due to severe hyponatremia or seizure.   ·  CK elevated, 8/18 - 88,700. Improving, downtrending CK to 61,000   - Monitored CPK, BMP every 12 hours  - Monitored urinary output.     Problem/Plan - 4:  Elevated liver enzymes.   - Likely related to rhabdomyolysis, downtrending AST to 846, ALT uptrending to 147, with normal bili and alk phos.   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     HOSPITAL COURSE: 44Y M w no PMH presented to [facility] with AMS and seizure like activity. Took 40mg THC and 1000mg CBD in attempt to sleep. Wife states that he ate 40 mg of THC and 1000 mg of CBD in an attempt to sleep. Per patient report, he felt "intoxicated." States that "he drank water for 2 and a half hours straight'. Vomited once, and then had seizure like activity, described as "slightly out of it", wife otherwise noted drooling, shaking, and teeth clenched. In the ED patient was given 1L NaCl (Na improved from 116 to 120). He was admitted to MICU and started on 2mcg DDAVP Q6H and D5W boluses. Patient was noted to have leukocytosis and right lower lobe infiltrates mild fever and was started on Ceftriaxone and azithromycin for aspiration pneumonia, now transitioned to amoxicillin 875mg BID. Patient is transferred to [facility] for further monitoring of sodium level, pneumonia, rhabdomyolysis, elevated LFTs. Patient was noted to have rising AST/ALT, RUQ ultrasound was performed and hepatitis serologies ordered which was unremarkable. Since arrival to the floors, patient has been stable and afebrile.       Changes to medications: None   New Medications: None     Physical Exam on discharge:   General: not in acute distress. 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 Improving hyponatremia, today at 136 overcorrected with 134 on repeat BMP s/p DDAVP 2mcg push. Patient now medically clear for discharge per renal recs.       Problem/Plan - 2: Pneumonia, aspiration.   - Likely due to aspiration, given CXR with RLL infiltrate, and WBC was elevated 16.7. Improving pneumonia given normal WBC of 6.2  Stopped azithromycin (2 doses given Aug 16 and 17), CTX 1g from 8/16-8/18 (3 doses)  - c/w amox-clav 875 mg BID x 4 days for a total of 7d abx (in prep for d/c).     Problem/Plan - 3: Rhabdomyolysis- likely due to severe hyponatremia or seizure.   ·  CK elevated, 8/18 - 88,700. Improving, downtrending CK to 61,000. Per renal patient with low likelihood of developing GAB with current CK levels and maintenance fluid therapy might be detrimental to his current sodium status. Patient will be DC'd and will follow-up with renal team for repeat BMP and CK on 8/22/24 before he leaves on Saturday 8/24/24 back home to [place] where he will follow-up with primary care provider.      Problem/Plan - 4:  Elevated liver enzymes.   - Likely related to rhabdomyolysis, downtrending AST to 846, ALT uptrending to 147, with normal bili and alk phos.   - RUQ ultrasound- negative  - Hepatitis serology- negative   - monitored AST/ALT

## 2024-08-19 NOTE — PROGRESS NOTE ADULT - SUBJECTIVE AND OBJECTIVE BOX
*** Transfer to [facility]***  44Y M w no PMH presented to [facility] with AMS and seizure like activity. Took 40mg THC and 1000mg CBD in attempt to sleep. Per ED provider note, EMS states that they responded to the 911 call from his wife. Wife states that he ate 40 mg of THC and 1000 mg of CBD in an attempt to sleep. They are visiting from [place]. Wife states that the next day morning he felt dizzy, so she instructed him to drink water. States that "he drank water for 2 and a half hours straight'. Vomited once, and then had seizure like activity, described as "slightly out of it", wife otherwise noted drooling, shaking, and teeth clenched. Wife states that he does not have a seizure hx, and this has never happened before. Also states that he has a couple glasses of wine the night before with dinner. When EMS got to the scene pt was combative, however they were able to get him on the stretcher and transport him here. Wife denied that he complained of any other symptoms, merely unsteady on his feet and feeling "intoxicated". In the ED patient was given 1L NaCl (Na improved from 116 to 120). He was admitted to MICU abnd started on 2mcg DDAVP Q6H and D5W boluses. Patient was noted to have lukocytosis and righ loer lobe infiltartes, mild fever and was started on Ctx/azithromycin for aspiration pna.      SUBJECTIVE: Patient seen and examined at bedside. Had no concerns. Mentating well.      Vital signs:  T(C): 36.7 (08-18-24 @ 21:00), Max: 37.5 (08-18-24 @ 17:33)  T(F): 98.1 (08-18-24 @ 21:00), Max: 99.5 (08-18-24 @ 17:33)  HR: 83 (08-18-24 @ 21:00) (78 - 96)  BP: 132/86 (08-18-24 @ 21:00) (132/86 - 153/80)  RR: 18 (08-18-24 @ 21:00) (12 - 23)  SpO2: 95% (08-18-24 @ 21:00) (95% - 99%)  Wt(kg): --    CAPILLARY BLOOD GLUCOSE    PHYSICAL EXAM: patient AOx4, walking, all exam wnl.    PHYSICAL EXAM  Constitutional: WDWN resting comfortably in bed; NAD  Neuro: AOx3, No focal defecits  Eyes: anicteric sclera  Respiratory: CTA B/L; no W/R/R, no retractions  Cardiac: +S1/S2; RRR  Gastrointestinal: soft, NT/ND; no rebound or guarding;  Extremities: no edema      MEDICATIONS  (STANDING):  amoxicillin  875 milliGRAM(s)/clavulanate 1 Tablet(s) Oral every 12 hours  chlorhexidine 2% Cloths 1 Application(s) Topical <User Schedule>  desmopressin Injectable 2 MICROGram(s) IV Push once  dextrose 5%. 1000 milliLiter(s) (250 mL/Hr) IV Continuous <Continuous>  enoxaparin Injectable 40 milliGRAM(s) SubCutaneous every 24 hours    ALLERGIES:  Allergies    No Known Allergies    Intolerances        LABS:  cret                        14.0   7.02  )-----------( 232      ( 18 Aug 2024 05:30 )             37.8     08-19    138  |  101  |  6<L>  ----------------------------<  103<H>  3.9   |  25  |  0.75    Ca    8.9      19 Aug 2024 01:11  Phos  2.7     08-18  Mg     2.1     08-18    TPro  6.9  /  Alb  4.0  /  TBili  1.0  /  DBili  x   /  AST  978<H>  /  ALT  138<H>  /  AlkPhos  67  08-18      Culture - Blood (collected 08-15-24 @ 11:46)  Source: .Blood Blood  Preliminary Report (08-17-24 @ 18:01):    No growth at 48 Hours    Culture - Blood (collected 08-15-24 @ 11:46)  Source: .Blood Blood  Preliminary Report (08-17-24 @ 18:01):    No growth at 48 Hours          EEG; No seizure activity    *** Transfer to [facility]***  44Y M w no PMH presented to [facility] with AMS and seizure like activity. Took 40mg THC and 1000mg CBD in attempt to sleep. Per ED provider note, EMS states that they responded to the 911 call from his wife. Wife states that he ate 40 mg of THC and 1000 mg of CBD in an attempt to sleep. They are visiting from [place]. Wife states that the next day morning he felt dizzy, so she instructed him to drink water. States that "he drank water for 2 and a half hours straight'. Vomited once, and then had seizure like activity, described as "slightly out of it", wife otherwise noted drooling, shaking, and teeth clenched. Wife states that he does not have a seizure hx, and this has never happened before. Also states that he has a couple glasses of wine the night before with dinner. When EMS got to the scene pt was combative, however they were able to get him on the stretcher and transport him here. Wife denied that he complained of any other symptoms, merely unsteady on his feet and feeling "intoxicated". In the ED patient was given 1L NaCl (Na improved from 116 to 120). He was admitted to MICU abnd started on 2mcg DDAVP Q6H and D5W boluses. Patient was noted to have leukocytosis and right loer lobe infiltrates mild fever and was started on Ctx/azithromycin for aspiration pna.      SUBJECTIVE: Patient seen and examined at bedside. Had no concerns. Mentating well.      Vital signs:  T(C): 36.7 (08-18-24 @ 21:00), Max: 37.5 (08-18-24 @ 17:33)  T(F): 98.1 (08-18-24 @ 21:00), Max: 99.5 (08-18-24 @ 17:33)  HR: 83 (08-18-24 @ 21:00) (78 - 96)  BP: 132/86 (08-18-24 @ 21:00) (132/86 - 153/80)  RR: 18 (08-18-24 @ 21:00) (12 - 23)  SpO2: 95% (08-18-24 @ 21:00) (95% - 99%)  Wt(kg): --    CAPILLARY BLOOD GLUCOSE    PHYSICAL EXAM: patient AOx4, walking, all exam wnl.    PHYSICAL EXAM  Constitutional: WDWN resting comfortably in bed; NAD  Neuro: AOx3, No focal defecits  Eyes: anicteric sclera  Respiratory: CTA B/L; no W/R/R, no retractions  Cardiac: +S1/S2; RRR  Gastrointestinal: soft, NT/ND; no rebound or guarding;  Extremities: no edema      MEDICATIONS  (STANDING):  amoxicillin  875 milliGRAM(s)/clavulanate 1 Tablet(s) Oral every 12 hours  chlorhexidine 2% Cloths 1 Application(s) Topical <User Schedule>  desmopressin Injectable 2 MICROGram(s) IV Push once  dextrose 5%. 1000 milliLiter(s) (250 mL/Hr) IV Continuous <Continuous>  enoxaparin Injectable 40 milliGRAM(s) SubCutaneous every 24 hours    ALLERGIES:  Allergies    No Known Allergies    Intolerances        LABS:  cret                        14.0   7.02  )-----------( 232      ( 18 Aug 2024 05:30 )             37.8     08-19    138  |  101  |  6<L>  ----------------------------<  103<H>  3.9   |  25  |  0.75    Ca    8.9      19 Aug 2024 01:11  Phos  2.7     08-18  Mg     2.1     08-18    TPro  6.9  /  Alb  4.0  /  TBili  1.0  /  DBili  x   /  AST  978<H>  /  ALT  138<H>  /  AlkPhos  67  08-18      Culture - Blood (collected 08-15-24 @ 11:46)  Source: .Blood Blood  Preliminary Report (08-17-24 @ 18:01):    No growth at 48 Hours    Culture - Blood (collected 08-15-24 @ 11:46)  Source: .Blood Blood  Preliminary Report (08-17-24 @ 18:01):    No growth at 48 Hours          EEG; No seizure activity    *** Transfer to [facility]***  44Y M w no PMH presented to [facility] with AMS and seizure like activity. Took 40mg THC and 1000mg CBD in attempt to sleep. Per ED provider note, EMS states that they responded to the 911 call from his wife. Wife states that he ate 40 mg of THC and 1000 mg of CBD in an attempt to sleep. They are visiting from [place]. Wife states that the next day morning he felt dizzy, so she instructed him to drink water. States that "he drank water for 2 and a half hours straight'. Vomited once, and then had seizure like activity, described as "slightly out of it", wife otherwise noted drooling, shaking, and teeth clenched. Wife states that he does not have a seizure hx, and this has never happened before. Also states that he has a couple glasses of wine the night before with dinner. When EMS got to the scene pt was combative, however they were able to get him on the stretcher and transport him here. Wife denied that he complained of any other symptoms, merely unsteady on his feet and feeling "intoxicated". In the ED patient was given 1L NaCl (Na improved from 116 to 120). He was admitted to MICU abnd started on 2mcg DDAVP Q6H and D5W boluses. Patient was noted to have leukocytosis and right loer lobe infiltrates mild fever and was started on Ctx/azithromycin for aspiration pna, now transitioned to amoxicillin 875mg BID. Patient is transferred to [facility] for further monitoring of sodium level, pneumonia, rhabdomyolysis, elevated LFTs.      SUBJECTIVE: Patient seen and examined at bedside. Had no concerns. Mentating well.      Vital signs:  T(C): 36.7 (08-18-24 @ 21:00), Max: 37.5 (08-18-24 @ 17:33)  T(F): 98.1 (08-18-24 @ 21:00), Max: 99.5 (08-18-24 @ 17:33)  HR: 83 (08-18-24 @ 21:00) (78 - 96)  BP: 132/86 (08-18-24 @ 21:00) (132/86 - 153/80)  RR: 18 (08-18-24 @ 21:00) (12 - 23)  SpO2: 95% (08-18-24 @ 21:00) (95% - 99%)  Wt(kg): --    CAPILLARY BLOOD GLUCOSE    PHYSICAL EXAM: patient AOx4, walking, all exam wnl.    PHYSICAL EXAM  Constitutional: WDWN resting comfortably in bed; NAD  Neuro: AOx3, No focal defecits  Eyes: anicteric sclera  Respiratory: CTA B/L; no W/R/R, no retractions  Cardiac: +S1/S2; RRR  Gastrointestinal: soft, NT/ND; no rebound or guarding;  Extremities: no edema      MEDICATIONS  (STANDING):  amoxicillin  875 milliGRAM(s)/clavulanate 1 Tablet(s) Oral every 12 hours  chlorhexidine 2% Cloths 1 Application(s) Topical <User Schedule>  desmopressin Injectable 2 MICROGram(s) IV Push once  dextrose 5%. 1000 milliLiter(s) (250 mL/Hr) IV Continuous <Continuous>  enoxaparin Injectable 40 milliGRAM(s) SubCutaneous every 24 hours    ALLERGIES:  Allergies    No Known Allergies    Intolerances        LABS:  cret                        14.0   7.02  )-----------( 232      ( 18 Aug 2024 05:30 )             37.8     08-19    138  |  101  |  6<L>  ----------------------------<  103<H>  3.9   |  25  |  0.75    Ca    8.9      19 Aug 2024 01:11  Phos  2.7     08-18  Mg     2.1     08-18    TPro  6.9  /  Alb  4.0  /  TBili  1.0  /  DBili  x   /  AST  978<H>  /  ALT  138<H>  /  AlkPhos  67  08-18      Culture - Blood (collected 08-15-24 @ 11:46)  Source: .Blood Blood  Preliminary Report (08-17-24 @ 18:01):    No growth at 48 Hours    Culture - Blood (collected 08-15-24 @ 11:46)  Source: .Blood Blood  Preliminary Report (08-17-24 @ 18:01):    No growth at 48 Hours          EEG; No seizure activity

## 2024-08-19 NOTE — PROGRESS NOTE ADULT - ASSESSMENT
43 y/o M w no PMH transferred from [facility] with AMS and seizure like activity. Took 40mg of THC and 1000mg CBD last night plus a glass of wine and wake up this morning feeling dizzy, then started drinking water, as per pt's wife: "he drank water for 2 and a half hours straight and started vomiting and having abnormal behaviour, he was slightly out of it, and unsteady on his feet". Wife states that he does not have a seizure hx, and this has never happened before. When EMS got to the scene pt was combative. Corrected Na upon arrival this am was 117meq, now pte in ICU with Na 124, corrected 7 meq in 5h period, Nephrology consulted.     Pt is now asymptomatic, hyponatremia resolved, coherent.    PLAN  Nephrology discharge instructions  -Fluid restriction 1.2 L/dad  Encouraged protein riched diet  Advised t see his GP upon return to [place]  43 y/o M w no PMH transferred from [facility] with AMS and seizure like activity. Took 40mg of THC and 1000mg CBD last night plus a glass of wine and wake up this morning feeling dizzy, then started drinking water, as per pt's wife: "he drank water for 2 and a half hours straight and started vomiting and having abnormal behaviour, he was slightly out of it, and unsteady on his feet". Wife states that he does not have a seizure hx, and this has never happened before. When EMS got to the scene pt was combative. Corrected Na upon arrival this am was 117meq, now pte in ICU with Na 124, corrected 7 meq in 5h period, Nephrology consulted.     Pt is now asymptomatic, hyponatremia resolved, coherent.    PLAN  Nephrology discharge instructions  -Fluid restriction 1.2 L/d  Encouraged protein rich diet  Pt can follow  up with Dr Mccormack out outpatient if evening  ckp is downtrending.    Dr. Mccormack already made arrangement for the patent to see him privately

## 2024-08-19 NOTE — DISCHARGE NOTE PROVIDER - ATTENDING DISCHARGE PHYSICAL EXAMINATION:
Attending Attestation - Patient seen and examined    Exam on day of discharge:  Appears comfortable   MMM  Normal WOB on RA, CTAB   RRR, no mrg   Abdomen soft, nontender, nondistended  Extremities warm and without edema   AOX3, no focal neuro deficits     Med Changes: None  Pending labs/tests: None   Provider F/U: Nephrology   Recommendations for outpatient: Continue drinking water to thirst. Discussed with patient that he needs to follow up with nephrology in the next 2-3 days, confirmed he has their contact information. Patient discharged with an elevated CK; however, was downtrending at discharge without fluids and on discussion with nephrology, it was felt reasonable to discharge patient given they are not recommending intervention with IV fluids in setting of his recent hyponatremia. Patient and nephrology team with plan to have him come in to have CK and other labs checked in the next 2-3 days to ensure improving. Note that his creatinine/kidney function have not been shown to be affected by rhabdomyolysis. Strict return precautions were given for any changes in urine color or output.     Patient stable for discharge to home.    30 min spent on DC. Discussed with housestaff.    Thank you for allowing the [facility] Teaching [facility] to care for your patient. For any questions after your discharge not answered by your primary care physician or providers you have been scheduled with for follow-up above, please call [phone] and ask to speak to the Senior House Officer

## 2024-08-19 NOTE — PROGRESS NOTE ADULT - ATTENDING COMMENTS
Aug 17th  Na 124 -> 1500  123 -> 21:42   Aug 18  133 -> 1:30  137 -> 5:30   At this point there was 15Meq of Na change in less than 24 hours.   In the morning recommended to give 2mcg of DDAVP and 1L of D5W  137 -> 16:00  Currently getting 1L of D5 W.   Check Na after D5W. Na goal till tomorrow morning 133-135.     Please inform us of any abrupt changes in NA.
I agree with findings of Dr Terrence Parekh    Please start Urena 15 g TID.   Goal of correction as mentioned above.   Please obtain Urine Osm with BMP
Hyponatremia overcorrection on Sat night to Sunday morning. Nephrology team was not made aware of that. We gave him DDAVP and D5 W on Sunday. Pulled back with Na 134 on discharge.   LFTs are slowly down trending with some what improving LFTs    He really wants to be discharged. I have given him my personal phone number to call if he has any symptoms. I have advised him to come to [facility] on Thursday to get labs checked as it would be difficult to arrange it as an outpatient. He has verbalized understanding and will come to ED on Thursday before flying to [place] on Sat.
have reviewed history at length with wife , and have spoken with pt who is somewhat improved, but still with hypona,   agree with findings and recommendation.
symptomatic hyponatremia iso THC with subsequent excessive free water intake  hypertonic saline with DDAVP clamp  increased 116-->124 in 24 hours  Per wife mental status significantly improved from presentation  Has been very agitated overnight and placed on precedex gtt
45 yo M with no significant PMH who presented originally after seizures secondary to severe symptomatic hyponatremia, originally admitted to MICU, now with stable sodium and transferred to floors. Found to have rhabdomyolysis iso hyponatremia and previous seizures.     Seen at bedside this morning, feels well and asking when he can be discharged.     VS reviewed and stable.     Exam:   Appears comfortable   MMM  Normal WOB on RA, CTAB   RRR, no mrg   Abdomen soft, nontender, nondistended  Extremities warm and without edema   AOX3, no focal neuro deficits     Labs reviewed, AM Na 136, corrected   CK elevated to >60k   Cr appears to be at baseline     Imaging reviewed.     Plan:   -Prior hyponatremia likely from primary polydipsia, has resolved this AM   -Start continuous NS infusion for rhabdomyolysis  -Trend CK BID, can discontinue fluids when <15k   -Updated patient on plan at bedside, recommend he stay inpatient until CK has decreased to avoid kidney damage     Rest as per resident note.

## 2024-08-19 NOTE — PROGRESS NOTE ADULT - PROBLEM SELECTOR PLAN 3
CK elevated, 8/18 [id number]. Shi score 4 points: Low risk   - Monitor CPK, BMP every 12 hours CK elevated, 8/18 [id number]. Shi score 4 points: Low risk   - Monitor CPK, BMP every 12 hours  - Monitor urinary output

## 2024-08-19 NOTE — PROGRESS NOTE ADULT - ASSESSMENT
Patient is 44 YOM with no significant PMH who presents from [facility] w seizures likely 2/2 symptomatic hyponatremia. Patient neurologic status much improved this AM, AOx3, sodium remains within goal, continuing to improve.

## 2024-08-19 NOTE — DISCHARGE NOTE PROVIDER - CARE PROVIDER_API CALL
Chino Mccormack A  Nephrology  [address]  [place] [address]  Phone: [phone]  Fax: [phone]  Follow Up Time: 1-3 days

## 2024-08-19 NOTE — PROGRESS NOTE ADULT - REASON FOR ADMISSION
symptomatic hyponatremia

## 2024-08-19 NOTE — DISCHARGE NOTE NURSING/CASE MANAGEMENT/SOCIAL WORK - NSDCPETBCESMAN_GEN_ALL_CORE
If you are a smoker, it is important for your health to stop smoking. Please be aware that second hand smoke is also harmful. Pt received in rm 20, 26Y Female, Aox4, ambulatory, c/o RLQ pain x2 weeks. Pt reports pain went away and then came back sharp 7/10 pain. Pt reports LMP 2 weeks ago. Pt c/o nausea no vomiting, denies any burning /frequency on urination, sob, cp, diarrhea, recent travel or being around anyone sick. Pt vitals as charted, IV access obtained, labs sent as ordered, will continue to monitor.

## 2024-08-19 NOTE — PROGRESS NOTE ADULT - PROBLEM SELECTOR PLAN 2
Placed in Trendelenberg previously, spiked fevers soon after. WBc was elevated 11.75, now normal 7.02   CXR w RLL infiltrate,, Stopped azithromycin (2 doses given Aug 16 and 17)  CTX 1g from 8/16-8/18 (3 doses)  - c/w amox clav 875 mg BID x 4 days for a total of 7d abx (in prep for d/c)

## 2024-08-19 NOTE — DISCHARGE NOTE PROVIDER - NSDCCPCAREPLAN_GEN_ALL_CORE_FT
PRINCIPAL DISCHARGE DIAGNOSIS  Diagnosis: Hyponatremia  Assessment and Plan of Treatment: You came to the hospital for seizure-like activity, and were diagnosed with hyponatremia. Hyponatremia is low sodium levels in the blood.  Please return to the hospital if you experience seizure-like activity, dizziness and fatigue. Please follow up with your primary doctor within 1-2 weeks of discharge.      SECONDARY DISCHARGE DIAGNOSES  Diagnosis: Rhabdomyolysis  Assessment and Plan of Treatment: You were diagnosed with rhabdomyolysis during your hospitalization. Rhabdomyolysis is the breakdown of muscle tisseu that leads to the release of muscle fiber contents into the blood. Your muscle enzymes were monitored prior to discharge. Please return to hospital if you experience dark urine, no urine output, or extreme muscle soreness. Please follow with your primary doctor within 1-2 weeks after discharge.     PRINCIPAL DISCHARGE DIAGNOSIS  Diagnosis: Hyponatremia  Assessment and Plan of Treatment: You came to the hospital for seizure-like activity,. You were diagnosed with hyponatremia. Hyponatremia is low sodium levels in the blood. You were treated by increasing and monitoring your sodium levels Please return to the hospital if you experience seizures, dizziness or fatigue. Please follow up with your primary doctor within 1-2 weeks of discharge.      SECONDARY DISCHARGE DIAGNOSES  Diagnosis: Rhabdomyolysis  Assessment and Plan of Treatment: You were diagnosed with rhabdomyolysis during your hospitalization. Rhabdomyolysis is the breakdown of muscle tisseu that leads to the release of muscle fiber contents into the blood. Your muscle enzymes were monitored prior to discharge. Please return to hospital if you experience dark urine, no urine output, or extreme muscle soreness. Please follow with your primary doctor within 1-2 weeks after discharge.

## 2024-08-20 LAB
CULTURE RESULTS: SIGNIFICANT CHANGE UP
CULTURE RESULTS: SIGNIFICANT CHANGE UP
SPECIMEN SOURCE: SIGNIFICANT CHANGE UP
SPECIMEN SOURCE: SIGNIFICANT CHANGE UP

## 2024-08-22 DIAGNOSIS — M62.82 RHABDOMYOLYSIS: ICD-10-CM

## 2024-08-22 PROBLEM — Z00.00 ENCOUNTER FOR PREVENTIVE HEALTH EXAMINATION: Status: ACTIVE | Noted: 2024-08-22

## 2024-08-23 LAB
ALBUMIN SERPL ELPH-MCNC: 4.7 G/DL
ALP BLD-CCNC: 68 U/L
ALT SERPL-CCNC: 174 U/L
ANION GAP SERPL CALC-SCNC: 12 MMOL/L
APPEARANCE: CLEAR
AST SERPL-CCNC: 258 U/L
BILIRUB SERPL-MCNC: 0.8 MG/DL
BILIRUBIN URINE: NEGATIVE
BLOOD URINE: NEGATIVE
BUN SERPL-MCNC: 15 MG/DL
CALCIUM SERPL-MCNC: 10.2 MG/DL
CHLORIDE SERPL-SCNC: 96 MMOL/L
CK SERPL-CCNC: 2845 U/L
CO2 SERPL-SCNC: 26 MMOL/L
COLOR: YELLOW
CREAT SERPL-MCNC: 0.75 MG/DL
EGFR: 113 ML/MIN/1.73M2
GLUCOSE QUALITATIVE U: NEGATIVE MG/DL
GLUCOSE SERPL-MCNC: 111 MG/DL
KETONES URINE: NEGATIVE MG/DL
LEUKOCYTE ESTERASE URINE: NEGATIVE
NITRITE URINE: NEGATIVE
PH URINE: 6.5
POTASSIUM SERPL-SCNC: 5.4 MMOL/L
PROT SERPL-MCNC: 7.4 G/DL
PROTEIN URINE: NEGATIVE MG/DL
SODIUM SERPL-SCNC: 135 MMOL/L
SPECIFIC GRAVITY URINE: 1.02
T4 SERPL-MCNC: 5.4 UG/DL — SIGNIFICANT CHANGE UP
UROBILINOGEN URINE: 1 MG/DL

## 2024-08-27 DIAGNOSIS — R74.8 ABNORMAL LEVELS OF OTHER SERUM ENZYMES: ICD-10-CM

## 2024-08-27 DIAGNOSIS — E87.1 HYPO-OSMOLALITY AND HYPONATREMIA: ICD-10-CM

## 2024-08-27 DIAGNOSIS — N17.9 ACUTE KIDNEY FAILURE, UNSPECIFIED: ICD-10-CM

## 2024-08-27 DIAGNOSIS — J69.0 PNEUMONITIS DUE TO INHALATION OF FOOD AND VOMIT: ICD-10-CM

## 2024-08-27 DIAGNOSIS — G93.40 ENCEPHALOPATHY, UNSPECIFIED: ICD-10-CM

## 2024-08-27 DIAGNOSIS — M62.82 RHABDOMYOLYSIS: ICD-10-CM
